# Patient Record
Sex: FEMALE | Race: BLACK OR AFRICAN AMERICAN
[De-identification: names, ages, dates, MRNs, and addresses within clinical notes are randomized per-mention and may not be internally consistent; named-entity substitution may affect disease eponyms.]

---

## 2017-07-23 ENCOUNTER — HOSPITAL ENCOUNTER (EMERGENCY)
Dept: HOSPITAL 61 - ER | Age: 37
Discharge: HOME | End: 2017-07-23
Payer: COMMERCIAL

## 2017-07-23 VITALS — BODY MASS INDEX: 35.36 KG/M2 | WEIGHT: 220 LBS | HEIGHT: 66 IN

## 2017-07-23 VITALS — SYSTOLIC BLOOD PRESSURE: 239 MMHG | DIASTOLIC BLOOD PRESSURE: 114 MMHG

## 2017-07-23 DIAGNOSIS — I12.9: Primary | ICD-10-CM

## 2017-07-23 DIAGNOSIS — N18.9: ICD-10-CM

## 2017-07-23 DIAGNOSIS — Z99.2: ICD-10-CM

## 2017-07-23 DIAGNOSIS — E10.22: ICD-10-CM

## 2017-07-23 PROCEDURE — 99281 EMR DPT VST MAYX REQ PHY/QHP: CPT

## 2017-07-23 NOTE — PHYS DOC
Past Medical History


Past Medical History:  Diabetes-Type I, Hypertension, Renal Failure


Past Surgical History:  


Additional Past Surgical Histo:  PERITONEAL SURGERY


Alcohol Use:  None


Drug Use:  None





Adult General


Chief Complaint


Chief Complaint:  OTHER COMPLAINTS





HPI


HPI





37-year-old female who undergoes peritoneal dialysis was sent here by a local 

dialysis clinic for blood draw. The patient denies any symptoms she denies 

shortness of breath nausea vomiting fevers or chills. Onset today. Location 

generalized. Duration intermittent. No alleviating factors present.





ROS is negative for chest pain shortness of breath nausea vomiting fevers 

chills. All other review of systems is negative unless otherwise noted in 

history of present illness.





ED course: 37-year-old female presenting to the emergency department today 

desiring a blood draw. Triage vital signs afebrile with significant 

hypertension at 240 Nearly over 115. The remainder the vital signs were 

unremarkable. The patient was well-appearing nontoxic and not in any distress 

when I examined her. I explained to her that when evaluated in the emergency 

department we performed our lab here to ensure she does not have an emergent 

medical condition. I offered to forward a copy of those labs to her 

nephrologist. It is my concern that her potassium could be abnormal which I 

would want to have tested. The patient was upset and only desired for the blood 

to be drawn and taken with her to clinic. I explained to her my concerns. The 

patient then eloped without discharge instructions. She demonstrated medical 

decision making capacity at that time and understood the risks. Patient did not 

have any evidence of end organ damage.





Review of Systems


Review of Systems


SEE ABOVE.





Allergies


Allergies





Allergies








Coded Allergies Type Severity Reaction Last Updated Verified


 


  No Known Drug Allergies    17 No











Physical Exam


Physical Exam


SEE ABOVE





Constitutional: Well developed, well nourished, no acute distress, non-toxic 

appearance. []


HENT: Normocephalic, atraumatic, bilateral external ears normal, oropharynx 

moist, no oral exudates, nose normal. []


Eyes: PERRLA, EOMI, conjunctiva normal, no discharge. 


Neck: Normal range of motion, no tenderness, supple, no stridor. [] 


Cardiovascular:Heart rate regular rhythm, no murmur []


Lungs & Thorax:  Bilateral breath sounds clear to auscultation 


Abdomen: Bowel sounds normal, soft, no tenderness, no masses, no pulsatile 

masses. [] 


Skin: Warm, dry, no erythema, no rash. [] 


Back: No tenderness, no CVA tenderness. [] 


Extremities: No tenderness, no cyanosis, no clubbing, ROM intact, no edema. 


Neurologic: Alert and oriented X 3, normal motor function, normal sensory 

function, no focal deficits noted. []


Psychologic: Affect normal, judgement normal, mood normal. []





Current Patient Data


Vital Signs





 Vital Signs








  Date Time  Temp Pulse Resp B/P (MAP) Pulse Ox O2 Delivery O2 Flow Rate FiO2


 


17 12:05 97.7 69 18  99 Room Air  





 97.7       











EKG


EKG


[]





Radiology/Procedures


Radiology/Procedures


[]





Course & Med Decision Making


Course & Med Decision Making


Pertinent Labs and Imaging studies reviewed. (See chart for details)





[]





Dragon Disclaimer


Dragon Disclaimer


This electronic medical record was generated, in whole or in part, using a 

voice recognition dictation system.





Departure


Departure


Impression:  


 Primary Impression:  


 Hypertension


Disposition:   HOME, SELF-CARE (ELOPED)


Condition:  STABLE


Referrals:  


NON,STAFF (PCP)











NOLA DIANA MD 2017 13:10

## 2018-02-18 ENCOUNTER — HOSPITAL ENCOUNTER (EMERGENCY)
Dept: HOSPITAL 61 - ER | Age: 38
Discharge: HOME | End: 2018-02-18
Payer: MEDICARE

## 2018-02-18 DIAGNOSIS — M25.552: Primary | ICD-10-CM

## 2018-02-18 DIAGNOSIS — I12.9: ICD-10-CM

## 2018-02-18 DIAGNOSIS — N18.9: ICD-10-CM

## 2018-02-18 DIAGNOSIS — E10.22: ICD-10-CM

## 2018-02-18 PROCEDURE — 73502 X-RAY EXAM HIP UNI 2-3 VIEWS: CPT

## 2018-02-18 PROCEDURE — 99284 EMERGENCY DEPT VISIT MOD MDM: CPT

## 2019-04-13 ENCOUNTER — HOSPITAL ENCOUNTER (INPATIENT)
Dept: HOSPITAL 61 - ER | Age: 39
LOS: 1 days | Discharge: HOME | DRG: 391 | End: 2019-04-14
Attending: FAMILY MEDICINE | Admitting: FAMILY MEDICINE
Payer: MEDICARE

## 2019-04-13 VITALS — HEIGHT: 64 IN | BODY MASS INDEX: 38.76 KG/M2 | WEIGHT: 227 LBS

## 2019-04-13 VITALS — SYSTOLIC BLOOD PRESSURE: 188 MMHG | DIASTOLIC BLOOD PRESSURE: 78 MMHG

## 2019-04-13 VITALS — DIASTOLIC BLOOD PRESSURE: 79 MMHG | SYSTOLIC BLOOD PRESSURE: 160 MMHG

## 2019-04-13 VITALS — DIASTOLIC BLOOD PRESSURE: 92 MMHG | SYSTOLIC BLOOD PRESSURE: 204 MMHG

## 2019-04-13 DIAGNOSIS — E10.22: ICD-10-CM

## 2019-04-13 DIAGNOSIS — E66.01: ICD-10-CM

## 2019-04-13 DIAGNOSIS — Z99.2: ICD-10-CM

## 2019-04-13 DIAGNOSIS — K21.9: Primary | ICD-10-CM

## 2019-04-13 DIAGNOSIS — Z82.49: ICD-10-CM

## 2019-04-13 DIAGNOSIS — D64.9: ICD-10-CM

## 2019-04-13 DIAGNOSIS — F32.9: ICD-10-CM

## 2019-04-13 DIAGNOSIS — N18.6: ICD-10-CM

## 2019-04-13 DIAGNOSIS — Z98.891: ICD-10-CM

## 2019-04-13 DIAGNOSIS — I12.0: ICD-10-CM

## 2019-04-13 DIAGNOSIS — Z79.4: ICD-10-CM

## 2019-04-13 DIAGNOSIS — R07.89: ICD-10-CM

## 2019-04-13 DIAGNOSIS — E78.5: ICD-10-CM

## 2019-04-13 DIAGNOSIS — Z83.3: ICD-10-CM

## 2019-04-13 LAB
ALBUMIN SERPL-MCNC: 3.6 G/DL (ref 3.4–5)
ALBUMIN/GLOB SERPL: 0.7 {RATIO} (ref 1–1.7)
ALP SERPL-CCNC: 58 U/L (ref 46–116)
ALT SERPL-CCNC: 14 U/L (ref 14–59)
ANION GAP SERPL CALC-SCNC: 16 MMOL/L (ref 6–14)
AST SERPL-CCNC: 17 U/L (ref 15–37)
BASOPHILS # BLD AUTO: 0.1 X10^3/UL (ref 0–0.2)
BASOPHILS NFR BLD: 1 % (ref 0–3)
BILIRUB SERPL-MCNC: 0.4 MG/DL (ref 0.2–1)
BUN SERPL-MCNC: 49 MG/DL (ref 7–20)
BUN/CREAT SERPL: 5 (ref 6–20)
CALCIUM SERPL-MCNC: 9.3 MG/DL (ref 8.5–10.1)
CHLORIDE SERPL-SCNC: 96 MMOL/L (ref 98–107)
CO2 SERPL-SCNC: 25 MMOL/L (ref 21–32)
CREAT SERPL-MCNC: 9.3 MG/DL (ref 0.6–1)
EOSINOPHIL NFR BLD: 0.1 X10^3/UL (ref 0–0.7)
EOSINOPHIL NFR BLD: 1 % (ref 0–3)
ERYTHROCYTE [DISTWIDTH] IN BLOOD BY AUTOMATED COUNT: 19.8 % (ref 11.5–14.5)
GFR SERPLBLD BASED ON 1.73 SQ M-ARVRAT: 5.8 ML/MIN
GLOBULIN SER-MCNC: 4.9 G/DL (ref 2.2–3.8)
GLUCOSE SERPL-MCNC: 332 MG/DL (ref 70–99)
HCT VFR BLD CALC: 39.1 % (ref 36–47)
HGB BLD-MCNC: 12.1 G/DL (ref 12–15.5)
LIPASE: 167 U/L (ref 73–393)
LYMPHOCYTES # BLD: 3.5 X10^3/UL (ref 1–4.8)
LYMPHOCYTES NFR BLD AUTO: 38 % (ref 24–48)
MCH RBC QN AUTO: 26 PG (ref 25–35)
MCHC RBC AUTO-ENTMCNC: 31 G/DL (ref 31–37)
MCV RBC AUTO: 85 FL (ref 79–100)
MONO #: 0.8 X10^3/UL (ref 0–1.1)
MONOCYTES NFR BLD: 9 % (ref 0–9)
NEUT #: 4.8 X10^3UL (ref 1.8–7.7)
NEUTROPHILS NFR BLD AUTO: 52 % (ref 31–73)
PLATELET # BLD AUTO: 277 X10^3/UL (ref 140–400)
POTASSIUM SERPL-SCNC: 3.7 MMOL/L (ref 3.5–5.1)
PROT SERPL-MCNC: 8.5 G/DL (ref 6.4–8.2)
RBC # BLD AUTO: 4.63 X10^6/UL (ref 3.5–5.4)
SODIUM SERPL-SCNC: 137 MMOL/L (ref 136–145)
WBC # BLD AUTO: 9.2 X10^3/UL (ref 4–11)

## 2019-04-13 PROCEDURE — 84484 ASSAY OF TROPONIN QUANT: CPT

## 2019-04-13 PROCEDURE — 96361 HYDRATE IV INFUSION ADD-ON: CPT

## 2019-04-13 PROCEDURE — 83690 ASSAY OF LIPASE: CPT

## 2019-04-13 PROCEDURE — 96375 TX/PRO/DX INJ NEW DRUG ADDON: CPT

## 2019-04-13 PROCEDURE — 71045 X-RAY EXAM CHEST 1 VIEW: CPT

## 2019-04-13 PROCEDURE — 80053 COMPREHEN METABOLIC PANEL: CPT

## 2019-04-13 PROCEDURE — 36415 COLL VENOUS BLD VENIPUNCTURE: CPT

## 2019-04-13 PROCEDURE — 96374 THER/PROPH/DIAG INJ IV PUSH: CPT

## 2019-04-13 PROCEDURE — 85025 COMPLETE CBC W/AUTO DIFF WBC: CPT

## 2019-04-13 PROCEDURE — 80069 RENAL FUNCTION PANEL: CPT

## 2019-04-13 PROCEDURE — 76705 ECHO EXAM OF ABDOMEN: CPT

## 2019-04-13 PROCEDURE — 82962 GLUCOSE BLOOD TEST: CPT

## 2019-04-13 PROCEDURE — 93005 ELECTROCARDIOGRAM TRACING: CPT

## 2019-04-13 RX ADMIN — HYDRALAZINE HYDROCHLORIDE PRN MG: 20 INJECTION INTRAMUSCULAR; INTRAVENOUS at 18:59

## 2019-04-13 NOTE — PDOC1
History and Physical


Date of Admission


Date of Admission


DATE: 19 


TIME: 21:00





Identification/Chief Complaint


Chief Complaint


 seen in er with complaints of mid chest pain which radiates into her right arm 

and upper abdominal pain. 





reports she had eaten lunch at [employer] around 3:15 and following the meal she 

had onset of symptoms, on arrival has complaints of nausea and during initial 

exam with this patient had active vomiting. sono neg for gallstones





Past Medical History


Past Medical History


   


 Past Medical History 


Past Medical History


Past Medical History:  Diabetes-Type I, Hypertension, Renal Failure


Past Surgical History:  


Additional Past Surgical Histo:  PERITONEAL SURGERY


Alcohol Use:  None


Drug Use:  None





FAMILY HX OBESITY, DIABETES


Endocrine:  Diabetes





Family History


Family History:  High Cholestrol





Social History


Smoke:  No


ALCOHOL:  none


Drugs:  None





Current Problem List


Problem List


Problems


Medical Problems:


(1) Chest pain


Status: Acute  





(2) Shortness of breath


Status: Acute  











Current Medications


Current Medications





Current Medications


Ondansetron HCl (Zofran) 4 mg 1X  ONCE IV  Last administered on 19at 16:05

;  Start 19 at 16:00;  Stop 19 at 16:02;  Status DC


Sodium Chloride 1,000 ml @  1,000 mls/hr 1X  ONCE IV  Last administered on at 16:15;  Start 19 at 16:15;  Stop 19 at 17:14;  Status DC


Fentanyl Citrate (Fentanyl 2ml Vial) 25 mcg 1X  ONCE IV  Last administered on at 16:20;  Start 19 at 16:15;  Stop 19 at 16:16;  Status DC


Clonidine HCl (Catapres) 0.1 mg 1X  ONCE PO  Last administered on 19at 17:

45;  Start 19 at 17:45;  Stop 19 at 17:50;  Status DC


Aspirin (Children'S Aspirin) 324 mg 1X  ONCE PO  Last administered on 19at 

17:45;  Start 19 at 17:45;  Stop 19 at 17:50;  Status DC


Hydralazine HCl (Apresoline Inj) 10 mg PRN Q4HRS  PRN IVP ELEVATED BP, SEE 

COMMENTS Last administered on 19at 18:59;  Start 19 at 18:45


Amlodipine Besylate (Norvasc) 10 mg DAILY PO ;  Start 19 at 09:00


Carvedilol (Coreg) 12.5 mg BIDWMEALS PO ;  Start 19 at 08:00


Citalopram Hydrobromide (CeleXA) 10 mg DAILY PO ;  Start 19 at 09:00;  

Stop 19 at 09:00;  Status DC


Citalopram Hydrobromide (CeleXA) 10 mg HS PO ;  Start 19 at 21:00


Loperamide HCl (Imodium) 2 mg PRN Q8HRS  PRN PO DIARRHEA Last administered on at 20:10;  Start 19 at 20:00





Active Scripts


Active


Reported


Celexa (Citalopram Hydrobromide) 10 Mg Tablet 1 Tab PO DAILY


Coreg  ** (Carvedilol) 12.5 Mg Tablet 12.5 Mg PO BIDWMEALS


Amlodipine Besylate 10 Mg Tablet 10 Mg PO DAILY





Allergies


Allergies:  


Coded Allergies:  


     No Known Drug Allergies (Unverified , 19)





ROS


Review of System





Review of Systems


Review of Systems





Constitutional: Denies fever or chills []


Eyes: Denies change in visual acuity, redness, or eye pain []


HENT: Denies nasal congestion or sore throat []


Respiratory: Denies cough or shortness of breath []


Cardiovascular: Reports mid to rt side CP


GI: Denies bloody stools or diarrhea. Reports rt upper abd pain with N/V


: Denies dysuria or hematuria []


Musculoskeletal: Denies back/neck pain. Reports rt upper arm pain


Integument: Denies rash or skin lesions []


Neurologic: Denies headache, focal weakness or sensory changes []


Endocrine: Denies polyuria or polydipsia []





14 pt  systems were reviewed and found to be within normal limits, except as 

documented


Gastrointestinal:  Yes Vomiting





Physical Exam


Physical Exam





Physical Exam


Physical Exam





Constitutional: Well developed, well nourished, mild distress on initial exam, 

non-toxic appearance. []


HENT: Normocephalic, atraumatic, oropharynx moist, no oral exudates, nose 

normal. []


Eyes: Pupils equal, conjunctiva normal, no discharge. [] 


Neck: Normal range of motion, no tenderness, supple, no stridor. [] 


Cardiovascular: Heart rate regular rhythm, no murmur []


Lungs & Thorax:  Bilateral breath sounds clear to auscultation. Resp. equal/

nonlabored


Abdomen: Bowel sounds normal, soft- no distention/rigidity. Tender on palp. RUQ

, no masses, no pulsatile masses. [] 


Skin: Warm, dry, no erythema, no rash. [] 


Back: No tenderness, no CVA tenderness. [] 


Extremities: No tenderness, no cyanosis, no clubbing, ROM intact, no edema. [] 


Neurologic: Alert and oriented X 3, normal motor function, normal sensory 

function, no focal deficits noted. []


Psychologic: Affect normal, judgement normal, mood normal. []


General:  Alert, Oriented X3, Cooperative, mild distress


HEENT:  Atraumatic


Lungs:  Clear to auscultation


Breasts:  Not examined


Abdomen:  Normal bowel sounds, Soft


Rectal Exam:  not examined


Neuro:  Normal speech, Cranial nerves 3-12 NL


Psych/Mental Status:  Mental status NL, Mood NL





Vitals


Vitals





Vital Signs








  Date Time  Temp Pulse Resp B/P (MAP) Pulse Ox O2 Delivery O2 Flow Rate FiO2


 


19 19:39      Room Air  


 


19 19:00 97.0 74 18 160/79 (106) 99   





 97.0       











Labs


Labs





Laboratory Tests








Test


 19


15:55 19


20:54


 


White Blood Count


 9.2 x10^3/uL


(4.0-11.0) 





 


Red Blood Count


 4.63 x10^6/uL


(3.50-5.40) 





 


Hemoglobin


 12.1 g/dL


(12.0-15.5) 





 


Hematocrit


 39.1 %


(36.0-47.0) 





 


Mean Corpuscular Volume 85 fL ()  


 


Mean Corpuscular Hemoglobin 26 pg (25-35)  


 


Mean Corpuscular Hemoglobin


Concent 31 g/dL


(31-37) 





 


Red Cell Distribution Width


 19.8 %


(11.5-14.5) 





 


Platelet Count


 277 x10^3/uL


(140-400) 





 


Neutrophils (%) (Auto) 52 % (31-73)  


 


Lymphocytes (%) (Auto) 38 % (24-48)  


 


Monocytes (%) (Auto) 9 % (0-9)  


 


Eosinophils (%) (Auto) 1 % (0-3)  


 


Basophils (%) (Auto) 1 % (0-3)  


 


Neutrophils # (Auto)


 4.8 x10^3uL


(1.8-7.7) 





 


Lymphocytes # (Auto)


 3.5 x10^3/uL


(1.0-4.8) 





 


Monocytes # (Auto)


 0.8 x10^3/uL


(0.0-1.1) 





 


Eosinophils # (Auto)


 0.1 x10^3/uL


(0.0-0.7) 





 


Basophils # (Auto)


 0.1 x10^3/uL


(0.0-0.2) 





 


Sodium Level


 137 mmol/L


(136-145) 





 


Potassium Level


 3.7 mmol/L


(3.5-5.1) 





 


Chloride Level


 96 mmol/L


() 





 


Carbon Dioxide Level


 25 mmol/L


(21-32) 





 


Anion Gap 16 (6-14)  


 


Blood Urea Nitrogen


 49 mg/dL


(7-20) 





 


Creatinine


 9.3 mg/dL


(0.6-1.0) 





 


Estimated GFR


(Cockcroft-Gault) 5.8 


 





 


BUN/Creatinine Ratio 5 (6-20)  


 


Glucose Level


 332 mg/dL


(70-99) 





 


Calcium Level


 9.3 mg/dL


(8.5-10.1) 





 


Total Bilirubin


 0.4 mg/dL


(0.2-1.0) 





 


Aspartate Amino Transf


(AST/SGOT) 17 U/L (15-37) 


 





 


Alanine Aminotransferase


(ALT/SGPT) 14 U/L (14-59) 


 





 


Alkaline Phosphatase


 58 U/L


() 





 


Troponin I Quantitative


 < 0.017 ng/mL


(0.000-0.055) 





 


Total Protein


 8.5 g/dL


(6.4-8.2) 





 


Albumin


 3.6 g/dL


(3.4-5.0) 





 


Albumin/Globulin Ratio 0.7 (1.0-1.7)  


 


Lipase


 167 U/L


() 





 


Glucose (Fingerstick)


 


 295 mg/dL


(70-99)








Laboratory Tests








Test


 19


15:55 19


20:54


 


White Blood Count


 9.2 x10^3/uL


(4.0-11.0) 





 


Red Blood Count


 4.63 x10^6/uL


(3.50-5.40) 





 


Hemoglobin


 12.1 g/dL


(12.0-15.5) 





 


Hematocrit


 39.1 %


(36.0-47.0) 





 


Mean Corpuscular Volume 85 fL ()  


 


Mean Corpuscular Hemoglobin 26 pg (25-35)  


 


Mean Corpuscular Hemoglobin


Concent 31 g/dL


(31-37) 





 


Red Cell Distribution Width


 19.8 %


(11.5-14.5) 





 


Platelet Count


 277 x10^3/uL


(140-400) 





 


Neutrophils (%) (Auto) 52 % (31-73)  


 


Lymphocytes (%) (Auto) 38 % (24-48)  


 


Monocytes (%) (Auto) 9 % (0-9)  


 


Eosinophils (%) (Auto) 1 % (0-3)  


 


Basophils (%) (Auto) 1 % (0-3)  


 


Neutrophils # (Auto)


 4.8 x10^3uL


(1.8-7.7) 





 


Lymphocytes # (Auto)


 3.5 x10^3/uL


(1.0-4.8) 





 


Monocytes # (Auto)


 0.8 x10^3/uL


(0.0-1.1) 





 


Eosinophils # (Auto)


 0.1 x10^3/uL


(0.0-0.7) 





 


Basophils # (Auto)


 0.1 x10^3/uL


(0.0-0.2) 





 


Sodium Level


 137 mmol/L


(136-145) 





 


Potassium Level


 3.7 mmol/L


(3.5-5.1) 





 


Chloride Level


 96 mmol/L


() 





 


Carbon Dioxide Level


 25 mmol/L


(21-32) 





 


Anion Gap 16 (6-14)  


 


Blood Urea Nitrogen


 49 mg/dL


(7-20) 





 


Creatinine


 9.3 mg/dL


(0.6-1.0) 





 


Estimated GFR


(Cockcroft-Gault) 5.8 


 





 


BUN/Creatinine Ratio 5 (6-20)  


 


Glucose Level


 332 mg/dL


(70-99) 





 


Calcium Level


 9.3 mg/dL


(8.5-10.1) 





 


Total Bilirubin


 0.4 mg/dL


(0.2-1.0) 





 


Aspartate Amino Transf


(AST/SGOT) 17 U/L (15-37) 


 





 


Alanine Aminotransferase


(ALT/SGPT) 14 U/L (14-59) 


 





 


Alkaline Phosphatase


 58 U/L


() 





 


Troponin I Quantitative


 < 0.017 ng/mL


(0.000-0.055) 





 


Total Protein


 8.5 g/dL


(6.4-8.2) 





 


Albumin


 3.6 g/dL


(3.4-5.0) 





 


Albumin/Globulin Ratio 0.7 (1.0-1.7)  


 


Lipase


 167 U/L


() 





 


Glucose (Fingerstick)


 


 295 mg/dL


(70-99)











Images


Images





EXAM: RIGHT UPPER QUADRANT ULTRASOUND.


 


HISTORY: Right upper quadrant pain.


 


COMPARISON: None.


 


FINDINGS: Sonographic evaluation of the right upper quadrant was 


performed.


 


The liver appears normal in parenchymal echotexture. There are no focal 


lesions. 


 


The gallbladder is unremarkable without evidence of stones, wall 


thickening or pericholecystic fluid. There is no sonographic Peguero sign. 


The common duct measures 5 mm. The visualized portions of the head of the 


pancreas reveal no abnormality.


 


The right kidney measures 8.7 cm. Cortical thickness is preserved. 


Cortical echogenicity is mildly increased. There is no hydronephrosis.


 


The visualized portions of the abdominal aorta and inferior vena cava are 


grossly patent and normal in caliber.


 


IMPRESSION:


1. No cause for pain is identified.


2. Increased right renal cortical echogenicity is consistent with 


intrinsic renal disease.


 


Electronically signed by: CHRISTINA Saxena MD (2019 5:21 PM) 


[facility]





EXAM: CHEST 1 VIEW.


 


HISTORY: Chest pain.


 


COMPARISON: 2009.


 


FINDINGS: A frontal view of the chest is obtained.


 


There are no confluent infiltrates. There is no pneumothorax or pleural 


effusion. The heart is mildly enlarged given this projection.


 


IMPRESSION: 


1. Mild cardiomegaly.


 


Electronically signed by: CHRISTINA Saxena MD (2019 4:44 PM) 


[facility]














DICTATED and SIGNED BY:     ANJU SAXENA MD





VTE Prophylaxis Ordered


VTE Prophylaxis Devices:  Yes


VTE Pharmacological Prophylaxi:  Yes





Assessment/Plan


Assessment/Plan


impression





1. chest pain


The gallbladder is unremarkable without evidence of stones, wall 


thickening or pericholecystic fluid. There is no sonographic Peguero sign. on 

sono


2. ESRD on dialysis


4. diabetes suboptimal control


5. morbid obesity


6. HYPERTENSION


7. GERD








plan





cvc admit


serial troponin i 


echo


consult cardiology


consult nephrology


dvt prophylaxis


BP CONTROL


PROTONIX 40MG PO DAILY


ss insulin











KIMMIE PIMENTEL MD 2019 21:01

## 2019-04-13 NOTE — RAD
EXAM: RIGHT UPPER QUADRANT ULTRASOUND.

 

HISTORY: Right upper quadrant pain.

 

COMPARISON: None.

 

FINDINGS: Sonographic evaluation of the right upper quadrant was 

performed.

 

The liver appears normal in parenchymal echotexture. There are no focal 

lesions. 

 

The gallbladder is unremarkable without evidence of stones, wall 

thickening or pericholecystic fluid. There is no sonographic Peguero sign. 

The common duct measures 5 mm. The visualized portions of the head of the 

pancreas reveal no abnormality.

 

The right kidney measures 8.7 cm. Cortical thickness is preserved. 

Cortical echogenicity is mildly increased. There is no hydronephrosis.

 

The visualized portions of the abdominal aorta and inferior vena cava are 

grossly patent and normal in caliber.

 

IMPRESSION:

1. No cause for pain is identified.

2. Increased right renal cortical echogenicity is consistent with 

intrinsic renal disease.

 

Electronically signed by: CHRISTINA Saxena MD (4/13/2019 5:21 PM) 

[id number]

## 2019-04-13 NOTE — PHYS DOC
Past Medical History


Past Medical History:  Diabetes-Type I, Hypertension, Renal Failure


Past Surgical History:  


Additional Past Surgical Histo:  PERITONEAL SURGERY


Alcohol Use:  None


Drug Use:  None





Adult General


Chief Complaint


Chief Complaint:  CHEST PAIN





HPI


HPI





38 year old female presents to ER via POV for complaints of mid chest pain 

which radiates into her right arm and upper abdominal pain. Patient reports she 

had eaten lunch at [email or web address] around 3:15 and following the meal she had onset of 

symptoms. Patient on arrival has complaints of nausea and during initial exam 

with this patient had active vomiting. She does still have her gallbladder. 

Patient denies any recent flulike illness. She reports she was having no 

symptoms prior to lunch.





Review of Systems


Review of Systems





Constitutional: Denies fever or chills []


Eyes: Denies change in visual acuity, redness, or eye pain []


HENT: Denies nasal congestion or sore throat []


Respiratory: Denies cough or shortness of breath []


Cardiovascular: Reports mid to rt side CP


GI: Denies bloody stools or diarrhea. Reports rt upper abd pain with N/V


: Denies dysuria or hematuria []


Musculoskeletal: Denies back/neck pain. Reports rt upper arm pain


Integument: Denies rash or skin lesions []


Neurologic: Denies headache, focal weakness or sensory changes []


Endocrine: Denies polyuria or polydipsia []





All other systems were reviewed and found to be within normal limits, except as 

documented in this note.





Current Medications


Current Medications





Current Medications








 Medications


  (Trade)  Dose


 Ordered  Sig/Mikayla  Start Time


 Stop Time Status Last Admin


Dose Admin


 


 Fentanyl Citrate


  (Fentanyl 2ml


 Vial)  25 mcg  1X  ONCE  19 16:15


 19 16:16 DC 19 16:20


25 MCG


 


 Ondansetron HCl


  (Zofran)  4 mg  1X  ONCE  19 16:00


 19 16:02 DC 19 16:05


4 MG


 


 Sodium Chloride  1,000 ml @ 


 1,000 mls/hr  1X  ONCE  19 16:15


 19 17:14 DC 19 16:15


1,000 MLS/HR











Allergies


Allergies





Allergies








Coded Allergies Type Severity Reaction Last Updated Verified


 


  No Known Drug Allergies    19 No











Physical Exam


Physical Exam





Constitutional: Well developed, well nourished, mild distress on initial exam, 

non-toxic appearance. []


HENT: Normocephalic, atraumatic, oropharynx moist, no oral exudates, nose 

normal. []


Eyes: Pupils equal, conjunctiva normal, no discharge. [] 


Neck: Normal range of motion, no tenderness, supple, no stridor. [] 


Cardiovascular: Heart rate regular rhythm, no murmur []


Lungs & Thorax:  Bilateral breath sounds clear to auscultation. Resp. equal/

nonlabored


Abdomen: Bowel sounds normal, soft- no distention/rigidity. Tender on palp. RUQ

, no masses, no pulsatile masses. [] 


Skin: Warm, dry, no erythema, no rash. [] 


Back: No tenderness, no CVA tenderness. [] 


Extremities: No tenderness, no cyanosis, no clubbing, ROM intact, no edema. [] 


Neurologic: Alert and oriented X 3, normal motor function, normal sensory 

function, no focal deficits noted. []


Psychologic: Affect normal, judgement normal, mood normal. []





Current Patient Data


Vital Signs





 Vital Signs








  Date Time  Temp Pulse Resp B/P (MAP) Pulse Ox O2 Delivery O2 Flow Rate FiO2


 


19 17:34  81 16 206/91 (129)    


 


19 15:40 97.6    98 Room Air  





 97.6       








Lab Values





 Laboratory Tests








Test


 19


15:55


 


White Blood Count


 9.2 x10^3/uL


(4.0-11.0)


 


Red Blood Count


 4.63 x10^6/uL


(3.50-5.40)


 


Hemoglobin


 12.1 g/dL


(12.0-15.5)


 


Hematocrit


 39.1 %


(36.0-47.0)


 


Mean Corpuscular Volume


 85 fL ()





 


Mean Corpuscular Hemoglobin 26 pg (25-35)  


 


Mean Corpuscular Hemoglobin


Concent 31 g/dL


(31-37)


 


Red Cell Distribution Width


 19.8 %


(11.5-14.5)  H


 


Platelet Count


 277 x10^3/uL


(140-400)


 


Neutrophils (%) (Auto) 52 % (31-73)  


 


Lymphocytes (%) (Auto) 38 % (24-48)  


 


Monocytes (%) (Auto) 9 % (0-9)  


 


Eosinophils (%) (Auto) 1 % (0-3)  


 


Basophils (%) (Auto) 1 % (0-3)  


 


Neutrophils # (Auto)


 4.8 x10^3uL


(1.8-7.7)


 


Lymphocytes # (Auto)


 3.5 x10^3/uL


(1.0-4.8)


 


Monocytes # (Auto)


 0.8 x10^3/uL


(0.0-1.1)


 


Eosinophils # (Auto)


 0.1 x10^3/uL


(0.0-0.7)


 


Basophils # (Auto)


 0.1 x10^3/uL


(0.0-0.2)


 


Sodium Level


 137 mmol/L


(136-145)


 


Potassium Level


 3.7 mmol/L


(3.5-5.1)


 


Chloride Level


 96 mmol/L


()  L


 


Carbon Dioxide Level


 25 mmol/L


(21-32)


 


Anion Gap 16 (6-14)  H


 


Blood Urea Nitrogen


 49 mg/dL


(7-20)  H


 


Creatinine


 9.3 mg/dL


(0.6-1.0)  H


 


Estimated GFR


(Cockcroft-Gault) 5.8  





 


BUN/Creatinine Ratio 5 (6-20)  L


 


Glucose Level


 332 mg/dL


(70-99)  H


 


Calcium Level


 9.3 mg/dL


(8.5-10.1)


 


Total Bilirubin


 0.4 mg/dL


(0.2-1.0)


 


Aspartate Amino Transferase


(AST) 17 U/L (15-37)





 


Alanine Aminotransferase (ALT)


 14 U/L (14-59)





 


Alkaline Phosphatase


 58 U/L


()


 


Troponin I Quantitative


 < 0.017 ng/mL


(0.000-0.055)


 


Total Protein


 8.5 g/dL


(6.4-8.2)  H


 


Albumin


 3.6 g/dL


(3.4-5.0)


 


Albumin/Globulin Ratio


 0.7 (1.0-1.7)


L


 


Lipase


 167 U/L


()





 Laboratory Tests


19 15:55








 Laboratory Tests


19 15:55














EKG


EKG


EKG obtained 19 at 1547


Interpreted by ER physician





Sinus rhythm


Ltward axis


Rate 88


No STEMI





Radiology/Procedures


Radiology/Procedures


PROCEDURE: CHEST AP ONLY





EXAM: CHEST 1 VIEW.


 


HISTORY: Chest pain.


 


COMPARISON: 2009.


 


FINDINGS: A frontal view of the chest is obtained.


 


There are no confluent infiltrates. There is no pneumothorax or pleural 


effusion. The heart is mildly enlarged given this projection.


 


IMPRESSION: 


1. Mild cardiomegaly.


 


Electronically signed by: CHRISTINA Saxena MD (2019 4:44 PM) 


[facility] [place]














DICTATED and SIGNED BY:     ANJU SAXENA MD


DATE:     19 1644





PROCEDURE: ABDOMEN LTD





EXAM: RIGHT UPPER QUADRANT ULTRASOUND.


 


HISTORY: Right upper quadrant pain.


 


COMPARISON: None.


 


FINDINGS: Sonographic evaluation of the right upper quadrant was 


performed.


 


The liver appears normal in parenchymal echotexture. There are no focal 


lesions. 


 


The gallbladder is unremarkable without evidence of stones, wall 


thickening or pericholecystic fluid. There is no sonographic Peguero sign. 


The common duct measures 5 mm. The visualized portions of the head of the 


pancreas reveal no abnormality.


 


The right kidney measures 8.7 cm. Cortical thickness is preserved. 


Cortical echogenicity is mildly increased. There is no hydronephrosis.


 


The visualized portions of the abdominal aorta and inferior vena cava are 


grossly patent and normal in caliber.


 


IMPRESSION:


1. No cause for pain is identified.


2. Increased right renal cortical echogenicity is consistent with 


intrinsic renal disease.


 


Electronically signed by: CHRISTINA Saxena MD (2019 5:21 PM) 


[facility] [place] [facility] [place]














DICTATED and SIGNED BY:     ANJU SAXENA MD


DATE:     19 1721





Course & Med Decision Making


Course & Med Decision Making


Pertinent Labs and Imaging studies reviewed. (See chart for details)





1735: Patient was evaluated in the ER for complaints of sudden onset of right-

sided chest pain which radiated into her right arm. On exam patient also had 

right upper abdominal tenderness on palpation. Patient had EKG and labs 

obtained with no acute ST elevation or STEMI and initial troponin was <0.017;

chest x-ray showed mild organomegaly otherwise no acute findings. Ultrasound 

gallbladder was obtained with no acute findings noted. Patient reports her 

symptoms had improved as she had active vomiting during initial exam. Following 

dose of Zofran patient reported her nausea had subsided. Patient reported her 

parents both had cardiac history with her father having an MI in his 50s. With 

patient's onset of chest pain happening just prior to arrival to ER discussed 

admission to complete serial cardiac enzymes-patient is agreeable with plan. 

Will admit to hospitalist services for further care and monitoring. Will give 

patient 324 mg of aspirin. Patient's BP at time of admission discussion was 203/

82 with heart rate 95 respirations 18 and room air saturation at 98%. Will 

provide patient with dose of Clonidine 0.1mg PO. Pt does have history of 

hypertension and has not taken her prescribed medications today. 





1750: Spoke with Dr. Li, hospitalist and discussed pt's case and admit 

plan to complete serial cardiac enzymes. 





She reports her dialysis schedule is  however this 

week her schedule was changed and she had dialysis yesterday instead of today. 

HEART score is 2 however with pt having HTN/DM and family hx of CAD with both 

mother and father- admitted to further r/o ACS as her sxs started just prior to 

arrival to ER.





Dragon Disclaimer


Dragon Disclaimer


This electronic medical record was generated, in whole or in part, using a 

voice recognition dictation system.





Departure


Departure


Impression:  


 Primary Impression:  


 Chest pain


 Additional Impression:  


 Shortness of breath


Disposition:   ADMITTED AS INPATIENT


Admitting Physician:  Javier Gomez


Condition:  STABLE


Referrals:  


UNKNOWN PCP NAME (PCP)





Problem Qualifiers











PRECIOUS RCIHARDS 2019 16:34

## 2019-04-13 NOTE — RAD
EXAM: CHEST 1 VIEW.

 

HISTORY: Chest pain.

 

COMPARISON: 02/25/2009.

 

FINDINGS: A frontal view of the chest is obtained.

 

There are no confluent infiltrates. There is no pneumothorax or pleural 

effusion. The heart is mildly enlarged given this projection.

 

IMPRESSION: 

1. Mild cardiomegaly.

 

Electronically signed by: CHRISTINA Saxena MD (4/13/2019 4:44 PM) 

[facility]

## 2019-04-14 VITALS — SYSTOLIC BLOOD PRESSURE: 163 MMHG | DIASTOLIC BLOOD PRESSURE: 80 MMHG

## 2019-04-14 VITALS — SYSTOLIC BLOOD PRESSURE: 130 MMHG | DIASTOLIC BLOOD PRESSURE: 49 MMHG

## 2019-04-14 VITALS — SYSTOLIC BLOOD PRESSURE: 196 MMHG | DIASTOLIC BLOOD PRESSURE: 94 MMHG

## 2019-04-14 VITALS — SYSTOLIC BLOOD PRESSURE: 145 MMHG | DIASTOLIC BLOOD PRESSURE: 68 MMHG

## 2019-04-14 VITALS — DIASTOLIC BLOOD PRESSURE: 59 MMHG | SYSTOLIC BLOOD PRESSURE: 138 MMHG

## 2019-04-14 LAB
ALBUMIN SERPL-MCNC: 3.2 G/DL (ref 3.4–5)
ANION GAP SERPL CALC-SCNC: 13 MMOL/L (ref 6–14)
BASOPHILS # BLD AUTO: 0 X10^3/UL (ref 0–0.2)
BASOPHILS NFR BLD: 0 % (ref 0–3)
BUN SERPL-MCNC: 57 MG/DL (ref 7–20)
CALCIUM SERPL-MCNC: 8.8 MG/DL (ref 8.5–10.1)
CHLORIDE SERPL-SCNC: 98 MMOL/L (ref 98–107)
CO2 SERPL-SCNC: 26 MMOL/L (ref 21–32)
CREAT SERPL-MCNC: 9.8 MG/DL (ref 0.6–1)
EOSINOPHIL NFR BLD: 0.1 X10^3/UL (ref 0–0.7)
EOSINOPHIL NFR BLD: 2 % (ref 0–3)
ERYTHROCYTE [DISTWIDTH] IN BLOOD BY AUTOMATED COUNT: 20.4 % (ref 11.5–14.5)
GFR SERPLBLD BASED ON 1.73 SQ M-ARVRAT: 5.5 ML/MIN
GLUCOSE SERPL-MCNC: 180 MG/DL (ref 70–99)
HCT VFR BLD CALC: 37.9 % (ref 36–47)
HGB BLD-MCNC: 11.7 G/DL (ref 12–15.5)
LYMPHOCYTES # BLD: 2.4 X10^3/UL (ref 1–4.8)
LYMPHOCYTES NFR BLD AUTO: 32 % (ref 24–48)
MCH RBC QN AUTO: 26 PG (ref 25–35)
MCHC RBC AUTO-ENTMCNC: 31 G/DL (ref 31–37)
MCV RBC AUTO: 83 FL (ref 79–100)
MONO #: 0.8 X10^3/UL (ref 0–1.1)
MONOCYTES NFR BLD: 11 % (ref 0–9)
NEUT #: 4 X10^3UL (ref 1.8–7.7)
NEUTROPHILS NFR BLD AUTO: 55 % (ref 31–73)
PHOSPHATE SERPL-MCNC: 7 MG/DL (ref 2.6–4.7)
PLATELET # BLD AUTO: 243 X10^3/UL (ref 140–400)
POTASSIUM SERPL-SCNC: 4.2 MMOL/L (ref 3.5–5.1)
RBC # BLD AUTO: 4.54 X10^6/UL (ref 3.5–5.4)
SODIUM SERPL-SCNC: 137 MMOL/L (ref 136–145)
WBC # BLD AUTO: 7.3 X10^3/UL (ref 4–11)

## 2019-04-14 RX ADMIN — INSULIN LISPRO SCH UNITS: 100 INJECTION, SOLUTION INTRAVENOUS; SUBCUTANEOUS at 12:00

## 2019-04-14 RX ADMIN — HYDRALAZINE HYDROCHLORIDE PRN MG: 20 INJECTION INTRAMUSCULAR; INTRAVENOUS at 03:47

## 2019-04-14 RX ADMIN — INSULIN LISPRO SCH UNITS: 100 INJECTION, SOLUTION INTRAVENOUS; SUBCUTANEOUS at 08:00

## 2019-04-14 NOTE — NUR
PATIENTS IV OUT AND MONITOR OFF. PRESCRIPTION FOR PEPCID GIVEN TO PATIENT. DISCUSSED FOLLOW 
UP WITH PATIENT. PATIENT HAS NO QUESTIONS AT THIS TIME. PATIENT ESCORTED TO PERSONAL 
VEHICLE.

## 2019-04-14 NOTE — PDOC2
CARDIOLOGY CONSULT NOTE


CHEIF COMPLAINT:


Chest discomfort





HPI:


Patient is a pleasant 38-year-old woman with past medical history as noted 

below presenting to the hospital in the setting of some chest discomfort. She 

has a long-standing history of multiple comorbidities as noted and in this 

setting had some discomfort in her chest. She did not associate this discomfort 

with any activity nor was there any associated shortness of breath. She reports 

that this felt like a sharp pain. She does not recall any trauma. No 

significant alleviating or aggravating factors. No association with food. 

Patient had stress testing as noted at [facility] and these were 

reviewed.





Since admission the hospital the patient feels better and wishes to go home. No 

syncope, palpitations, orthopnea or PND. She has been tolerating her dialysis 

well.





PMHX:


1. End-stage renal disease with a left thigh fistula for dialysis


2. Hypertension


3. Dyslipidemia


4. Depression


5. Type 1 diabetes





SOCHX:


No alcohol, tobacco or illicit drug use. She works in sales.





FAMHX:


Notable for kidney disease. No history of sudden cardiac death.





CURRENT MEDS:





Current Medications








 Medications


  (Trade)  Dose


 Ordered  Sig/Mikayla  Start Time


 Stop Time Status Last Admin


Dose Admin


 


 Amlodipine


 Besylate


  (Norvasc)  10 mg  DAILY  4/14/19 09:00


     





 


 Aspirin


  (Children'S


 Aspirin)  324 mg  1X  ONCE  4/13/19 17:45


 4/13/19 17:50 DC 4/13/19 17:45


324 MG


 


 Carvedilol


  (Coreg)  12.5 mg  BIDWMEALS  4/14/19 08:00


     





 


 Citalopram


 Hydrobromide


  (CeleXA)  10 mg  HS  4/13/19 21:00


    4/13/19 21:00


10 MG


 


 Clonidine HCl


  (Catapres)  0.1 mg  1X  ONCE  4/13/19 17:45


 4/13/19 17:50 DC 4/13/19 17:45


0.1 MG


 


 Dextrose


  (Dextrose


 50%-Water Syringe)  12.5 gm  PRN Q15MIN  PRN  4/13/19 21:00


     





 


 Fentanyl Citrate


  (Fentanyl 2ml


 Vial)  25 mcg  1X  ONCE  4/13/19 16:15


 4/13/19 16:16 DC 4/13/19 16:20


25 MCG


 


 Heparin Sodium


  (Porcine)


  (Heparin Sodium)  5,000 unit  Q8HRS  4/14/19 14:00


     





 


 Hydralazine HCl


  (Apresoline Inj)  10 mg  PRN Q4HRS  PRN  4/13/19 18:45


    4/14/19 03:47


10 MG


 


 Insulin Human


 Lispro


  (HumaLOG)  0-5 UNITS  TIDWMEALS  4/14/19 08:00


     





 


 Loperamide HCl


  (Imodium)  2 mg  PRN Q8HRS  PRN  4/13/19 20:00


    4/13/19 20:10


2 MG


 


 Ondansetron HCl


  (Zofran)  4 mg  1X  ONCE  4/13/19 16:00


 4/13/19 16:02 DC 4/13/19 16:05


4 MG


 


 Pantoprazole


 Sodium


  (Protonix)  40 mg  DAILYAC  4/14/19 07:30


     





 


 Sodium Chloride  1,000 ml @ 


 1,000 mls/hr  1X  ONCE  4/13/19 16:15


 4/13/19 17:14 DC 4/13/19 16:15


1,000 MLS/HR











ALLERGIES:





Allergies








Coded Allergies Type Severity Reaction Last Updated Verified


 


  No Known Drug Allergies    4/13/19 No











ROS:


Negative for 10 out of 14 systems reviewed unless otherwise mentioned above in 

history of present illness





PHYSICAL EXAM:


Vital Signs:





Vital Signs








  Date Time  Temp Pulse Resp B/P (MAP) Pulse Ox O2 Delivery O2 Flow Rate FiO2


 


4/14/19 10:52 97.8 72 17 163/80 (107) 98 Room Air  





 97.8       








I & O











Intake and Output 


 


 4/14/19





 07:00


 


Intake Total 700 ml


 


Output Total 300 ml


 


Balance 400 ml


 


 


 


Intake Oral 200 ml


 


IV Total 500 ml


 


Output Urine Total 300 ml


 


# Voids 1








Physical Exam:


The patient appeared well nourished and normally developed. 


Head exam is unremarkable. No scleral icterus or corneal arcus noted. Neck is 

without jugular venous distension, thyromegaly, or carotid bruits. Carotid 

upstrokes are brisk bilaterally. 


Lungs are clear to auscultation and percussion.


 Cardiac exam reveals the PMI to be normally sized and situated. Rhythm is 

regular. First and second heart sounds normal. No murmurs, rubs or gallops. 


Abdominal exam reveals normal bowel sounds, no masses, no organomegaly and no 

aortic enlargement. 


Extremities are nonedematous. L thigh fistula is well functioning. Diminished 

radial pulses. 


Msk: No traumua


Neuro: No focal deficits





DIAGNOSTIC TESTING:


Cardiac enzymes are negative. Chest x-ray is unremarkable


EKG demonstrates no acute findings with mild left axis deviation and possible 

prior anteroseptal infarct although this may be related to lead placement


Stress test in 2017 at [facility] was within normal limits


Echocardiogram in July 2018 at [facility] was within normal limits.





ASSESSMENT:


1. Atypical chest pain likely related to either muscular skeletal etiology or 

neuropathic pain.


2. Hypertension


3. End-stage renal disease





PLAN:


-Patient overall is feeling better since her blood pressures been better 

controlled. Given that she's had negative stress testing and echocardiogram in 

the last one to 2 years with the non-concerning EKG and a negative biomarker 

study okay to discharge from a cardiac standpoint with close follow-up with her 

primary transplant and cardiology team at [facility].





If she wishes to remain in the hospital we could consider repeat echocardiogram 

in the near future otherwise supportive care. Continue dialysis per her usual 

schedule.











IFRAH DOMINGUEZ MD Apr 14, 2019 12:17

## 2019-04-14 NOTE — PDOC3
Discharge Summary


Visit Information


Date of Admission:  Apr 13, 2019


Date of Discharge:  Apr 14, 2019


Admitting Diagnosis:  chest pain


Final Diagnosis


1. chest pain


2. ESRD on dialysis


4. diabetes suboptimal control


5. morbid obesity


6. HYPERTENSION


7. GERD








  








Problems


Medical Problems:


(1) Chest pain


Status: Acute  





(2) Shortness of breath


Status: Acute  








Brief Hospital Course


Allergies





 Allergies








Coded Allergies Type Severity Reaction Last Updated Verified


 


  No Known Drug Allergies    4/13/19 No








Vital Signs





Vital Signs








  Date Time  Temp Pulse Resp B/P (MAP) Pulse Ox O2 Delivery O2 Flow Rate FiO2


 


4/14/19 13:13  72  163/80    


 


4/14/19 10:52 97.8  17  98 Room Air  





 97.8       








Lab Results





Laboratory Tests








Test


 4/13/19


15:55 4/13/19


20:54 4/14/19


03:15 4/14/19


08:06


 


White Blood Count


 9.2 x10^3/uL


(4.0-11.0) 


 7.3 x10^3/uL


(4.0-11.0) 





 


Red Blood Count


 4.63 x10^6/uL


(3.50-5.40) 


 4.54 x10^6/uL


(3.50-5.40) 





 


Hemoglobin


 12.1 g/dL


(12.0-15.5) 


 11.7 g/dL


(12.0-15.5) 





 


Hematocrit


 39.1 %


(36.0-47.0) 


 37.9 %


(36.0-47.0) 





 


Mean Corpuscular Volume 85 fL ()   83 fL ()  


 


Mean Corpuscular Hemoglobin 26 pg (25-35)   26 pg (25-35)  


 


Mean Corpuscular Hemoglobin


Concent 31 g/dL


(31-37) 


 31 g/dL


(31-37) 





 


Red Cell Distribution Width


 19.8 %


(11.5-14.5) 


 20.4 %


(11.5-14.5) 





 


Platelet Count


 277 x10^3/uL


(140-400) 


 243 x10^3/uL


(140-400) 





 


Neutrophils (%) (Auto) 52 % (31-73)   55 % (31-73)  


 


Lymphocytes (%) (Auto) 38 % (24-48)   32 % (24-48)  


 


Monocytes (%) (Auto) 9 % (0-9)   11 % (0-9)  


 


Eosinophils (%) (Auto) 1 % (0-3)   2 % (0-3)  


 


Basophils (%) (Auto) 1 % (0-3)   0 % (0-3)  


 


Neutrophils # (Auto)


 4.8 x10^3uL


(1.8-7.7) 


 4.0 x10^3uL


(1.8-7.7) 





 


Lymphocytes # (Auto)


 3.5 x10^3/uL


(1.0-4.8) 


 2.4 x10^3/uL


(1.0-4.8) 





 


Monocytes # (Auto)


 0.8 x10^3/uL


(0.0-1.1) 


 0.8 x10^3/uL


(0.0-1.1) 





 


Eosinophils # (Auto)


 0.1 x10^3/uL


(0.0-0.7) 


 0.1 x10^3/uL


(0.0-0.7) 





 


Basophils # (Auto)


 0.1 x10^3/uL


(0.0-0.2) 


 0.0 x10^3/uL


(0.0-0.2) 





 


Sodium Level


 137 mmol/L


(136-145) 


 137 mmol/L


(136-145) 





 


Potassium Level


 3.7 mmol/L


(3.5-5.1) 


 4.2 mmol/L


(3.5-5.1) 





 


Chloride Level


 96 mmol/L


() 


 98 mmol/L


() 





 


Carbon Dioxide Level


 25 mmol/L


(21-32) 


 26 mmol/L


(21-32) 





 


Anion Gap 16 (6-14)   13 (6-14)  


 


Blood Urea Nitrogen


 49 mg/dL


(7-20) 


 57 mg/dL


(7-20) 





 


Creatinine


 9.3 mg/dL


(0.6-1.0) 


 9.8 mg/dL


(0.6-1.0) 





 


Estimated GFR


(Cockcroft-Gault) 5.8 


 


 5.5 


 





 


BUN/Creatinine Ratio 5 (6-20)    


 


Glucose Level


 332 mg/dL


(70-99) 


 180 mg/dL


(70-99) 





 


Calcium Level


 9.3 mg/dL


(8.5-10.1) 


 8.8 mg/dL


(8.5-10.1) 





 


Total Bilirubin


 0.4 mg/dL


(0.2-1.0) 


 


 





 


Aspartate Amino Transf


(AST/SGOT) 17 U/L (15-37) 


 


 


 





 


Alanine Aminotransferase


(ALT/SGPT) 14 U/L (14-59) 


 


 


 





 


Alkaline Phosphatase


 58 U/L


() 


 


 





 


Troponin I Quantitative


 < 0.017 ng/mL


(0.000-0.055) 


 0.017 ng/mL


(0.000-0.055) 





 


Total Protein


 8.5 g/dL


(6.4-8.2) 


 


 





 


Albumin


 3.6 g/dL


(3.4-5.0) 


 3.2 g/dL


(3.4-5.0) 





 


Albumin/Globulin Ratio 0.7 (1.0-1.7)    


 


Lipase


 167 U/L


() 


 


 





 


Glucose (Fingerstick)


 


 295 mg/dL


(70-99) 


 188 mg/dL


(70-99)


 


Phosphorus Level


 


 


 7.0 mg/dL


(2.6-4.7) 





 


Test


 4/14/19


11:53 


 


 





 


Glucose (Fingerstick)


 150 mg/dL


(70-99) 


 


 











Laboratory Tests








Test


 4/13/19


15:55 4/13/19


20:54 4/14/19


03:15 4/14/19


08:06


 


White Blood Count


 9.2 x10^3/uL


(4.0-11.0) 


 7.3 x10^3/uL


(4.0-11.0) 





 


Red Blood Count


 4.63 x10^6/uL


(3.50-5.40) 


 4.54 x10^6/uL


(3.50-5.40) 





 


Hemoglobin


 12.1 g/dL


(12.0-15.5) 


 11.7 g/dL


(12.0-15.5) 





 


Hematocrit


 39.1 %


(36.0-47.0) 


 37.9 %


(36.0-47.0) 





 


Mean Corpuscular Volume 85 fL ()   83 fL ()  


 


Mean Corpuscular Hemoglobin 26 pg (25-35)   26 pg (25-35)  


 


Mean Corpuscular Hemoglobin


Concent 31 g/dL


(31-37) 


 31 g/dL


(31-37) 





 


Red Cell Distribution Width


 19.8 %


(11.5-14.5) 


 20.4 %


(11.5-14.5) 





 


Platelet Count


 277 x10^3/uL


(140-400) 


 243 x10^3/uL


(140-400) 





 


Neutrophils (%) (Auto) 52 % (31-73)   55 % (31-73)  


 


Lymphocytes (%) (Auto) 38 % (24-48)   32 % (24-48)  


 


Monocytes (%) (Auto) 9 % (0-9)   11 % (0-9)  


 


Eosinophils (%) (Auto) 1 % (0-3)   2 % (0-3)  


 


Basophils (%) (Auto) 1 % (0-3)   0 % (0-3)  


 


Neutrophils # (Auto)


 4.8 x10^3uL


(1.8-7.7) 


 4.0 x10^3uL


(1.8-7.7) 





 


Lymphocytes # (Auto)


 3.5 x10^3/uL


(1.0-4.8) 


 2.4 x10^3/uL


(1.0-4.8) 





 


Monocytes # (Auto)


 0.8 x10^3/uL


(0.0-1.1) 


 0.8 x10^3/uL


(0.0-1.1) 





 


Eosinophils # (Auto)


 0.1 x10^3/uL


(0.0-0.7) 


 0.1 x10^3/uL


(0.0-0.7) 





 


Basophils # (Auto)


 0.1 x10^3/uL


(0.0-0.2) 


 0.0 x10^3/uL


(0.0-0.2) 





 


Sodium Level


 137 mmol/L


(136-145) 


 137 mmol/L


(136-145) 





 


Potassium Level


 3.7 mmol/L


(3.5-5.1) 


 4.2 mmol/L


(3.5-5.1) 





 


Chloride Level


 96 mmol/L


() 


 98 mmol/L


() 





 


Carbon Dioxide Level


 25 mmol/L


(21-32) 


 26 mmol/L


(21-32) 





 


Anion Gap 16 (6-14)   13 (6-14)  


 


Blood Urea Nitrogen


 49 mg/dL


(7-20) 


 57 mg/dL


(7-20) 





 


Creatinine


 9.3 mg/dL


(0.6-1.0) 


 9.8 mg/dL


(0.6-1.0) 





 


Estimated GFR


(Cockcroft-Gault) 5.8 


 


 5.5 


 





 


BUN/Creatinine Ratio 5 (6-20)    


 


Glucose Level


 332 mg/dL


(70-99) 


 180 mg/dL


(70-99) 





 


Calcium Level


 9.3 mg/dL


(8.5-10.1) 


 8.8 mg/dL


(8.5-10.1) 





 


Total Bilirubin


 0.4 mg/dL


(0.2-1.0) 


 


 





 


Aspartate Amino Transf


(AST/SGOT) 17 U/L (15-37) 


 


 


 





 


Alanine Aminotransferase


(ALT/SGPT) 14 U/L (14-59) 


 


 


 





 


Alkaline Phosphatase


 58 U/L


() 


 


 





 


Troponin I Quantitative


 < 0.017 ng/mL


(0.000-0.055) 


 0.017 ng/mL


(0.000-0.055) 





 


Total Protein


 8.5 g/dL


(6.4-8.2) 


 


 





 


Albumin


 3.6 g/dL


(3.4-5.0) 


 3.2 g/dL


(3.4-5.0) 





 


Albumin/Globulin Ratio 0.7 (1.0-1.7)    


 


Lipase


 167 U/L


() 


 


 





 


Glucose (Fingerstick)


 


 295 mg/dL


(70-99) 


 188 mg/dL


(70-99)


 


Phosphorus Level


 


 


 7.0 mg/dL


(2.6-4.7) 





 


Test


 4/14/19


11:53 


 


 





 


Glucose (Fingerstick)


 150 mg/dL


(70-99) 


 


 











Brief Hospital Course


Ms. De Dios  is a 38 old  admti with chest pain, GERD





Discharge Information


Condition at Discharge:  Improved


Follow Up:  Weeks


Disposition/Orders:  D/C to Home


Scheduled


Amlodipine Besylate (Amlodipine Besylate) 10 Mg Tablet, 10 MG PO DAILY for htn, 

(Reported)


   Entered as Reported by: KERRI GOMES RN on 4/13/19 1829


   Last Taken: Unknown Dose on 4/5/19      Last Action: Continued on 4/13/19 1844 by KERRI GOMES RN


Carvedilol (Coreg  **) 12.5 Mg Tablet, 12.5 MG PO BIDWMEALS for CARDIAC, (

Reported)


   Entered as Reported by: KERRI GOMES RN on 4/13/19 1834


   Last Taken: Unknown Dose on 4/5/19      Last Action: Continued on 4/13/19 1844 by KERRI GOMES RN


Citalopram Hydrobromide (Celexa) 10 Mg Tablet, 1 TAB PO DAILY for anxiety/

depression, #30 Ref 2 (Reported)


   Entered as Reported by: KERRI GOMES RN on 4/13/19 1834


   Last Taken: Unknown Dose on 4/12/19      Last Action: Continued on 4/13/19 1844 by KERRI GOMES RN


Famotidine (Pepcid) 20 Mg Tablet, 20 MG PO HS for heartburn, #30


   Prescribed by: MOIRA VAUGHN on 4/14/19 1335





Patient Instructions


Patient Instructions


ABd US  The gallbladder is unremarkable without evidence of stones, wall 


thickening or pericholecystic fluid. There is no sonographic Peguero sign. on 

sono





face to face 


< 30 min











MOIRA VAUGHN MD Apr 14, 2019 13:37

## 2019-04-14 NOTE — PDOC2
CONSULT


Date of Consult


Date of Consult


DATE: 4/14/19 


TIME: 11:52





Reason for Consult


Reason for Consult:


ESRD





Referring Physician


Referring Physician:


LOREN





Identification/Chief Complaint


Chief Complaint


CHEST PAIN





Source


Source:  Chart review, Patient





History of Present Illness


Reason for Visit:


THIS IS A 38 YR OLD PT WITH ESRD AND HAS BEEN ON HD SINCE 2016. ESRD DUE TO DM 

I AND HTN HX. HAS AN AV ACCESS IN HER THIGH FOR DIALYSIS. ADMITTED WITH CHEST 

PAIN AND IS NOW UNDERGOING CARDIOLOGY EVALUATION. SHE USUALLY SEES [facility] 

NEPHROLOGY FOR HER RENAL CARE. LABS ARE C/W ESRD. ALSO HAD SOME RUQ PAIN WHICH 

HAS RESOLVED SINCE ADMIT, IMAGING WAS UNREMARKABLE. USUALLY HAS OP HD ON TTS. 

THIS PAST WEEK SHE WAS UNABLE TO KEEP HER THUR APPT DUE TO A CONFLICT AND SO 

DID OP HD ON FRIDAY





Past Medical History


Cardiovascular:  HTN


Psych:  Depression


Renal/:  Chronic renal failure


Endocrine:  Diabetes, Hyperparathyroidism





Past Surgical History


Past Surgical History


THIGH AV ACCESS FOR HD





Family History


Family History:  High Cholestrol, Hypertension





Social History


No


ALCOHOL:  none


Drugs:  None


Lives:  with Family





Current Problem List


Problem List


Problems


Medical Problems:


(1) Chest pain


Status: Acute  





(2) Shortness of breath


Status: Acute  











Current Medications


Current Medications





Current Medications


Ondansetron HCl (Zofran) 4 mg 1X  ONCE IV  Last administered on 4/13/19at 16:05

;  Start 4/13/19 at 16:00;  Stop 4/13/19 at 16:02;  Status DC


Sodium Chloride 1,000 ml @  1,000 mls/hr 1X  ONCE IV  Last administered on 4/13/ 19at 16:15;  Start 4/13/19 at 16:15;  Stop 4/13/19 at 17:14;  Status DC


Fentanyl Citrate (Fentanyl 2ml Vial) 25 mcg 1X  ONCE IV  Last administered on 4/ 13/19at 16:20;  Start 4/13/19 at 16:15;  Stop 4/13/19 at 16:16;  Status DC


Clonidine HCl (Catapres) 0.1 mg 1X  ONCE PO  Last administered on 4/13/19at 17:

45;  Start 4/13/19 at 17:45;  Stop 4/13/19 at 17:50;  Status DC


Aspirin (Children'S Aspirin) 324 mg 1X  ONCE PO  Last administered on 4/13/19at 

17:45;  Start 4/13/19 at 17:45;  Stop 4/13/19 at 17:50;  Status DC


Hydralazine HCl (Apresoline Inj) 10 mg PRN Q4HRS  PRN IVP ELEVATED BP, SEE 

COMMENTS Last administered on 4/14/19at 03:47;  Start 4/13/19 at 18:45


Amlodipine Besylate (Norvasc) 10 mg DAILY PO ;  Start 4/14/19 at 09:00


Carvedilol (Coreg) 12.5 mg BIDWMEALS PO ;  Start 4/14/19 at 08:00


Citalopram Hydrobromide (CeleXA) 10 mg DAILY PO ;  Start 4/14/19 at 09:00;  

Stop 4/14/19 at 09:00;  Status DC


Citalopram Hydrobromide (CeleXA) 10 mg HS PO  Last administered on 4/13/19at 21:

00;  Start 4/13/19 at 21:00


Loperamide HCl (Imodium) 2 mg PRN Q8HRS  PRN PO DIARRHEA Last administered on 4/ 13/19at 20:10;  Start 4/13/19 at 20:00


Insulin Human Lispro (HumaLOG) 0-5 UNITS TIDWMEALS SQ ;  Start 4/14/19 at 08:00


Dextrose (Dextrose 50%-Water Syringe) 12.5 gm PRN Q15MIN  PRN IV SEE COMMENTS;  

Start 4/13/19 at 21:00


Pantoprazole Sodium (Protonix) 40 mg DAILYAC PO ;  Start 4/14/19 at 07:30


Heparin Sodium (Porcine) (Heparin Sodium) 5,000 unit Q8HRS SQ ;  Start 4/14/19 

at 14:00





Active Scripts


Active


Reported


Celexa (Citalopram Hydrobromide) 10 Mg Tablet 1 Tab PO DAILY


Coreg  ** (Carvedilol) 12.5 Mg Tablet 12.5 Mg PO BIDWMEALS


Amlodipine Besylate 10 Mg Tablet 10 Mg PO DAILY





Allergies


Allergies:  


Coded Allergies:  


     No Known Drug Allergies (Unverified , 4/13/19)





ROS


General:  YES: Fatigue, Appetite


PSYCHOLOGICAL ROS:  YES: Anxiety, Depression


Eyes:  Yes Decreased vision


ALLERGY AND IMMUNOLOGY:  YES: Seasonal Allergies


Respiratory:  YES: Cough, Shortness of breath


Cardiovascular:  yes Chest Pain


Gastrointestinal:  Yes Constipation


Genitourinary:  YES Other (ANURIA)


Musculoskeletal:  Yes Muscular Weakness


Neurological:  Yes Weakness


Skin:  Yes Dry Skin





Physical Exam


General:  Alert, Oriented X3, Cooperative, No acute distress


HEENT:  Atraumatic, PERRLA, EOMI, Mucous membr. moist/pink


Lungs:  Clear to auscultation, Normal air movement


Heart:  Regular rate, Normal S1, Normal S2


Abdomen:  Normal bowel sounds, Soft, No tenderness


Extremities:  No clubbing, No cyanosis


Skin:  No breakdown, No significant lesion


Neuro:  Normal speech


Psych/Mental Status:  Mental status NL, Mood NL


MUSCULOSKELETAL:  No joint tenderness, No deformity, No swelling





Vitals


VITALS





Vital Signs








  Date Time  Temp Pulse Resp B/P (MAP) Pulse Ox O2 Delivery O2 Flow Rate FiO2


 


4/14/19 10:52 97.8 72 17 163/80 (107) 98 Room Air  





 97.8       











Labs


Labs





Laboratory Tests








Test


 4/13/19


15:55 4/13/19


20:54 4/14/19


03:15 4/14/19


08:06


 


White Blood Count


 9.2 x10^3/uL


(4.0-11.0) 


 7.3 x10^3/uL


(4.0-11.0) 





 


Red Blood Count


 4.63 x10^6/uL


(3.50-5.40) 


 4.54 x10^6/uL


(3.50-5.40) 





 


Hemoglobin


 12.1 g/dL


(12.0-15.5) 


 11.7 g/dL


(12.0-15.5) 





 


Hematocrit


 39.1 %


(36.0-47.0) 


 37.9 %


(36.0-47.0) 





 


Mean Corpuscular Volume 85 fL ()   83 fL ()  


 


Mean Corpuscular Hemoglobin 26 pg (25-35)   26 pg (25-35)  


 


Mean Corpuscular Hemoglobin


Concent 31 g/dL


(31-37) 


 31 g/dL


(31-37) 





 


Red Cell Distribution Width


 19.8 %


(11.5-14.5) 


 20.4 %


(11.5-14.5) 





 


Platelet Count


 277 x10^3/uL


(140-400) 


 243 x10^3/uL


(140-400) 





 


Neutrophils (%) (Auto) 52 % (31-73)   55 % (31-73)  


 


Lymphocytes (%) (Auto) 38 % (24-48)   32 % (24-48)  


 


Monocytes (%) (Auto) 9 % (0-9)   11 % (0-9)  


 


Eosinophils (%) (Auto) 1 % (0-3)   2 % (0-3)  


 


Basophils (%) (Auto) 1 % (0-3)   0 % (0-3)  


 


Neutrophils # (Auto)


 4.8 x10^3uL


(1.8-7.7) 


 4.0 x10^3uL


(1.8-7.7) 





 


Lymphocytes # (Auto)


 3.5 x10^3/uL


(1.0-4.8) 


 2.4 x10^3/uL


(1.0-4.8) 





 


Monocytes # (Auto)


 0.8 x10^3/uL


(0.0-1.1) 


 0.8 x10^3/uL


(0.0-1.1) 





 


Eosinophils # (Auto)


 0.1 x10^3/uL


(0.0-0.7) 


 0.1 x10^3/uL


(0.0-0.7) 





 


Basophils # (Auto)


 0.1 x10^3/uL


(0.0-0.2) 


 0.0 x10^3/uL


(0.0-0.2) 





 


Sodium Level


 137 mmol/L


(136-145) 


 137 mmol/L


(136-145) 





 


Potassium Level


 3.7 mmol/L


(3.5-5.1) 


 4.2 mmol/L


(3.5-5.1) 





 


Chloride Level


 96 mmol/L


() 


 98 mmol/L


() 





 


Carbon Dioxide Level


 25 mmol/L


(21-32) 


 26 mmol/L


(21-32) 





 


Anion Gap 16 (6-14)   13 (6-14)  


 


Blood Urea Nitrogen


 49 mg/dL


(7-20) 


 57 mg/dL


(7-20) 





 


Creatinine


 9.3 mg/dL


(0.6-1.0) 


 9.8 mg/dL


(0.6-1.0) 





 


Estimated GFR


(Cockcroft-Gault) 5.8 


 


 5.5 


 





 


BUN/Creatinine Ratio 5 (6-20)    


 


Glucose Level


 332 mg/dL


(70-99) 


 180 mg/dL


(70-99) 





 


Calcium Level


 9.3 mg/dL


(8.5-10.1) 


 8.8 mg/dL


(8.5-10.1) 





 


Total Bilirubin


 0.4 mg/dL


(0.2-1.0) 


 


 





 


Aspartate Amino Transf


(AST/SGOT) 17 U/L (15-37) 


 


 


 





 


Alanine Aminotransferase


(ALT/SGPT) 14 U/L (14-59) 


 


 


 





 


Alkaline Phosphatase


 58 U/L


() 


 


 





 


Troponin I Quantitative


 < 0.017 ng/mL


(0.000-0.055) 


 0.017 ng/mL


(0.000-0.055) 





 


Total Protein


 8.5 g/dL


(6.4-8.2) 


 


 





 


Albumin


 3.6 g/dL


(3.4-5.0) 


 3.2 g/dL


(3.4-5.0) 





 


Albumin/Globulin Ratio 0.7 (1.0-1.7)    


 


Lipase


 167 U/L


() 


 


 





 


Glucose (Fingerstick)


 


 295 mg/dL


(70-99) 


 188 mg/dL


(70-99)


 


Phosphorus Level


 


 


 7.0 mg/dL


(2.6-4.7) 











Laboratory Tests








Test


 4/13/19


15:55 4/13/19


20:54 4/14/19


03:15 4/14/19


08:06


 


White Blood Count


 9.2 x10^3/uL


(4.0-11.0) 


 7.3 x10^3/uL


(4.0-11.0) 





 


Red Blood Count


 4.63 x10^6/uL


(3.50-5.40) 


 4.54 x10^6/uL


(3.50-5.40) 





 


Hemoglobin


 12.1 g/dL


(12.0-15.5) 


 11.7 g/dL


(12.0-15.5) 





 


Hematocrit


 39.1 %


(36.0-47.0) 


 37.9 %


(36.0-47.0) 





 


Mean Corpuscular Volume 85 fL ()   83 fL ()  


 


Mean Corpuscular Hemoglobin 26 pg (25-35)   26 pg (25-35)  


 


Mean Corpuscular Hemoglobin


Concent 31 g/dL


(31-37) 


 31 g/dL


(31-37) 





 


Red Cell Distribution Width


 19.8 %


(11.5-14.5) 


 20.4 %


(11.5-14.5) 





 


Platelet Count


 277 x10^3/uL


(140-400) 


 243 x10^3/uL


(140-400) 





 


Neutrophils (%) (Auto) 52 % (31-73)   55 % (31-73)  


 


Lymphocytes (%) (Auto) 38 % (24-48)   32 % (24-48)  


 


Monocytes (%) (Auto) 9 % (0-9)   11 % (0-9)  


 


Eosinophils (%) (Auto) 1 % (0-3)   2 % (0-3)  


 


Basophils (%) (Auto) 1 % (0-3)   0 % (0-3)  


 


Neutrophils # (Auto)


 4.8 x10^3uL


(1.8-7.7) 


 4.0 x10^3uL


(1.8-7.7) 





 


Lymphocytes # (Auto)


 3.5 x10^3/uL


(1.0-4.8) 


 2.4 x10^3/uL


(1.0-4.8) 





 


Monocytes # (Auto)


 0.8 x10^3/uL


(0.0-1.1) 


 0.8 x10^3/uL


(0.0-1.1) 





 


Eosinophils # (Auto)


 0.1 x10^3/uL


(0.0-0.7) 


 0.1 x10^3/uL


(0.0-0.7) 





 


Basophils # (Auto)


 0.1 x10^3/uL


(0.0-0.2) 


 0.0 x10^3/uL


(0.0-0.2) 





 


Sodium Level


 137 mmol/L


(136-145) 


 137 mmol/L


(136-145) 





 


Potassium Level


 3.7 mmol/L


(3.5-5.1) 


 4.2 mmol/L


(3.5-5.1) 





 


Chloride Level


 96 mmol/L


() 


 98 mmol/L


() 





 


Carbon Dioxide Level


 25 mmol/L


(21-32) 


 26 mmol/L


(21-32) 





 


Anion Gap 16 (6-14)   13 (6-14)  


 


Blood Urea Nitrogen


 49 mg/dL


(7-20) 


 57 mg/dL


(7-20) 





 


Creatinine


 9.3 mg/dL


(0.6-1.0) 


 9.8 mg/dL


(0.6-1.0) 





 


Estimated GFR


(Cockcroft-Gault) 5.8 


 


 5.5 


 





 


BUN/Creatinine Ratio 5 (6-20)    


 


Glucose Level


 332 mg/dL


(70-99) 


 180 mg/dL


(70-99) 





 


Calcium Level


 9.3 mg/dL


(8.5-10.1) 


 8.8 mg/dL


(8.5-10.1) 





 


Total Bilirubin


 0.4 mg/dL


(0.2-1.0) 


 


 





 


Aspartate Amino Transf


(AST/SGOT) 17 U/L (15-37) 


 


 


 





 


Alanine Aminotransferase


(ALT/SGPT) 14 U/L (14-59) 


 


 


 





 


Alkaline Phosphatase


 58 U/L


() 


 


 





 


Troponin I Quantitative


 < 0.017 ng/mL


(0.000-0.055) 


 0.017 ng/mL


(0.000-0.055) 





 


Total Protein


 8.5 g/dL


(6.4-8.2) 


 


 





 


Albumin


 3.6 g/dL


(3.4-5.0) 


 3.2 g/dL


(3.4-5.0) 





 


Albumin/Globulin Ratio 0.7 (1.0-1.7)    


 


Lipase


 167 U/L


() 


 


 





 


Glucose (Fingerstick)


 


 295 mg/dL


(70-99) 


 188 mg/dL


(70-99)


 


Phosphorus Level


 


 


 7.0 mg/dL


(2.6-4.7) 














Images


Images


HISTORY: Right upper quadrant pain.


 


COMPARISON: None.


 


FINDINGS: Sonographic evaluation of the right upper quadrant was 


performed.


 


The liver appears normal in parenchymal echotexture. There are no focal 


lesions. 


 


The gallbladder is unremarkable without evidence of stones, wall 


thickening or pericholecystic fluid. There is no sonographic Peguero sign. 


The common duct measures 5 mm. The visualized portions of the head of the 


pancreas reveal no abnormality.


 


The right kidney measures 8.7 cm. Cortical thickness is preserved. 


Cortical echogenicity is mildly increased. There is no hydronephrosis.


 


The visualized portions of the abdominal aorta and inferior vena cava are 


grossly patent and normal in caliber.


 


IMPRESSION:


1. No cause for pain is identified.


2. Increased right renal cortical echogenicity is consistent with 


intrinsic renal disease.





Assessment/Plan


Assessment/Plan


IMP





CHEST PAIN


ESRD


DM I


HTN


ANEMIA





PLAN





CARDIOLOGY EVALUATION


HOME MEDS


NORVASC STARTED TODAY


HD TOMORROW THEN TTS


WILL FOLLOW











BRITNEY MEYER MD Apr 14, 2019 11:58

## 2019-04-15 NOTE — EKG
[facility]

              [address] [place] [address]

Test Date:    2019               Test Time:    15:47:17

Pat Name:     TINA ERICKSON       Department:   

Patient ID:   [id number]           Room:         211 1

Gender:       F                        Technician:   

:          1980               Requested By: PRECIOUS RICHARDS

Order Number: [id number]           Reading MD:   Adrian Lewis

                                 Measurements

Intervals                              Axis          

Rate:         88                       P:            -72

OR:           170                      QRS:          -16

QRSD:         66                       T:            87

QT:           418                                    

QTc:          510                                    

                           Interpretive Statements

SINUS RHYTHM

LEFTWARD AXIS

CONSIDER LEFT VENTRICULAR HYPERTROPHY

QRS(T) CONTOUR ABNORMALITY

CONSIDER ANTEROSEPTAL INFARCT



Electronically Signed On 2019 12:41:35 CDT by Adrian Lweis

## 2019-10-02 ENCOUNTER — HOSPITAL ENCOUNTER (EMERGENCY)
Dept: HOSPITAL 61 - ER | Age: 39
Discharge: HOME | End: 2019-10-02
Payer: MEDICARE

## 2019-10-02 VITALS — HEIGHT: 66 IN | BODY MASS INDEX: 33.59 KG/M2 | WEIGHT: 209 LBS

## 2019-10-02 VITALS — SYSTOLIC BLOOD PRESSURE: 198 MMHG | DIASTOLIC BLOOD PRESSURE: 95 MMHG

## 2019-10-02 DIAGNOSIS — E11.9: ICD-10-CM

## 2019-10-02 DIAGNOSIS — R10.31: Primary | ICD-10-CM

## 2019-10-02 DIAGNOSIS — R11.2: ICD-10-CM

## 2019-10-02 DIAGNOSIS — Z98.890: ICD-10-CM

## 2019-10-02 DIAGNOSIS — Z91.040: ICD-10-CM

## 2019-10-02 DIAGNOSIS — N28.9: ICD-10-CM

## 2019-10-02 DIAGNOSIS — I10: ICD-10-CM

## 2019-10-02 LAB
ALBUMIN SERPL-MCNC: 3 G/DL (ref 3.4–5)
ALBUMIN/GLOB SERPL: 0.6 {RATIO} (ref 1–1.7)
ALP SERPL-CCNC: 61 U/L (ref 46–116)
ALT SERPL-CCNC: 19 U/L (ref 14–59)
ANION GAP SERPL CALC-SCNC: 12 MMOL/L (ref 6–14)
APTT PPP: YELLOW S
AST SERPL-CCNC: 18 U/L (ref 15–37)
BACTERIA #/AREA URNS HPF: (no result) /HPF
BASOPHILS # BLD AUTO: 0 X10^3/UL (ref 0–0.2)
BASOPHILS NFR BLD: 1 % (ref 0–3)
BILIRUB SERPL-MCNC: 0.3 MG/DL (ref 0.2–1)
BILIRUB UR QL STRIP: NEGATIVE
BUN SERPL-MCNC: 73 MG/DL (ref 7–20)
BUN/CREAT SERPL: 6 (ref 6–20)
CALCIUM SERPL-MCNC: 8.6 MG/DL (ref 8.5–10.1)
CHLORIDE SERPL-SCNC: 103 MMOL/L (ref 98–107)
CO2 SERPL-SCNC: 25 MMOL/L (ref 21–32)
CREAT SERPL-MCNC: 11.7 MG/DL (ref 0.6–1)
EOSINOPHIL NFR BLD: 0.1 X10^3/UL (ref 0–0.7)
EOSINOPHIL NFR BLD: 1 % (ref 0–3)
ERYTHROCYTE [DISTWIDTH] IN BLOOD BY AUTOMATED COUNT: 18 % (ref 11.5–14.5)
FIBRINOGEN PPP-MCNC: CLEAR MG/DL
GFR SERPLBLD BASED ON 1.73 SQ M-ARVRAT: 4.4 ML/MIN
GLOBULIN SER-MCNC: 5.1 G/DL (ref 2.2–3.8)
GLUCOSE SERPL-MCNC: 103 MG/DL (ref 70–99)
HCT VFR BLD CALC: 28.3 % (ref 36–47)
HGB BLD-MCNC: 9.3 G/DL (ref 12–15.5)
LYMPHOCYTES # BLD: 1.9 X10^3/UL (ref 1–4.8)
LYMPHOCYTES NFR BLD AUTO: 24 % (ref 24–48)
MCH RBC QN AUTO: 27 PG (ref 25–35)
MCHC RBC AUTO-ENTMCNC: 33 G/DL (ref 31–37)
MCV RBC AUTO: 83 FL (ref 79–100)
MONO #: 0.6 X10^3/UL (ref 0–1.1)
MONOCYTES NFR BLD: 7 % (ref 0–9)
NEUT #: 5.3 X10^3/UL (ref 1.8–7.7)
NEUTROPHILS NFR BLD AUTO: 67 % (ref 31–73)
NITRITE UR QL STRIP: NEGATIVE
PH UR STRIP: 7.5 [PH]
PLATELET # BLD AUTO: 193 X10^3/UL (ref 140–400)
POTASSIUM SERPL-SCNC: 4.6 MMOL/L (ref 3.5–5.1)
PROT SERPL-MCNC: 8.1 G/DL (ref 6.4–8.2)
PROT UR STRIP-MCNC: >=300 MG/DL
RBC # BLD AUTO: 3.43 X10^6/UL (ref 3.5–5.4)
RBC #/AREA URNS HPF: (no result) /HPF (ref 0–2)
SODIUM SERPL-SCNC: 140 MMOL/L (ref 136–145)
SQUAMOUS #/AREA URNS LPF: (no result) /LPF
U PREG PATIENT: NEGATIVE
UROBILINOGEN UR-MCNC: 0.2 MG/DL
WBC # BLD AUTO: 7.9 X10^3/UL (ref 4–11)
WBC #/AREA URNS HPF: (no result) /HPF (ref 0–4)

## 2019-10-02 PROCEDURE — 36415 COLL VENOUS BLD VENIPUNCTURE: CPT

## 2019-10-02 PROCEDURE — 96374 THER/PROPH/DIAG INJ IV PUSH: CPT

## 2019-10-02 PROCEDURE — 74177 CT ABD & PELVIS W/CONTRAST: CPT

## 2019-10-02 PROCEDURE — 81001 URINALYSIS AUTO W/SCOPE: CPT

## 2019-10-02 PROCEDURE — 99285 EMERGENCY DEPT VISIT HI MDM: CPT

## 2019-10-02 PROCEDURE — 80053 COMPREHEN METABOLIC PANEL: CPT

## 2019-10-02 PROCEDURE — 96375 TX/PRO/DX INJ NEW DRUG ADDON: CPT

## 2019-10-02 PROCEDURE — 81025 URINE PREGNANCY TEST: CPT

## 2019-10-02 PROCEDURE — 85025 COMPLETE CBC W/AUTO DIFF WBC: CPT

## 2019-10-02 RX ADMIN — HYDRALAZINE HYDROCHLORIDE ONE MG: 20 INJECTION INTRAMUSCULAR; INTRAVENOUS at 19:00

## 2019-10-02 RX ADMIN — HYDRALAZINE HYDROCHLORIDE ONE MG: 20 INJECTION INTRAMUSCULAR; INTRAVENOUS at 20:22

## 2019-10-02 NOTE — PHYS DOC
Past Medical History


Past Medical History:  Diabetes-Type II, Hypertension, Renal Failure


Past Surgical History:  


Additional Past Surgical Histo:  PERITONEAL SURGERY


Alcohol Use:  None


Drug Use:  None





Adult General


Chief Complaint


Chief Complaint:  ABDOMINAL PAIN





HPI


HPI





39-year-old female presents to the emergency department with complaints of right

lower quadrant abdominal pain. Patient states that 2 weeks ago she had similar 

complaints she was seen at  with CT scan and ultrasound performed which was 

unremarkable. Denies any fevers however has had some intermittent chills. She as

well describes 2 days of vomiting.  She describes the pain as stabbing. She 

states movement, coughing makes her pain worse. She denies any vaginal 

discharge. Patient has underlying history of HTN





Review of Systems


Review of Systems





Constitutional: intermittent fever/chills


Respiratory: Denies cough or shortness of breath []


Cardiovascular: No additional information not addressed in HPI []


GI: RLQ abdominal pain, vomiting


: Denies dysuria or hematuria []


Musculoskeletal: Denies back pain or joint pain []


Neurologic: Denies headache, focal weakness or sensory changes []





All other systems were reviewed and found to be within normal limits, except as 

documented in this note.





Current Medications


Current Medications





Current Medications








 Medications


  (Trade)  Dose


 Ordered  Sig/Mikayla  Start Time


 Stop Time Status Last Admin


Dose Admin


 


 Hydralazine HCl


  (Apresoline Inj)  10 mg  1X  ONCE  10/2/19 19:00


 10/2/19 19:01 DC  





 


 Iohexol


  (Omnipaque 300


 Mg/ml)  100 ml  STK-MED ONCE  10/2/19 20:58


 10/2/19 20:59 DC  





 


 Morphine Sulfate


  (Morphine


 Sulfate)  4 mg  1X  ONCE  10/2/19 19:00


 10/2/19 19:01 DC 10/2/19 20:22


4 MG


 


 Ondansetron HCl


  (Zofran)  4 mg  1X  ONCE  10/2/19 19:00


 10/2/19 19:01 DC 10/2/19 20:21


4 MG











Allergies


Allergies





Allergies








Coded Allergies Type Severity Reaction Last Updated Verified


 


  latex Allergy Intermediate RASH 19 Yes











Physical Exam


Physical Exam





Constitutional: Well developed, well nourished, mild distress 2/2 pain, non-

toxic appearance. []


HENT: Normocephalic, atraumatic, bilateral external ears normal, oropharynx 

moist, no oral exudates, nose normal. []


Eyes: PERRLA, EOMI, conjunctiva normal, no discharge. [] 


Cardiovascular:Heart rate regular rhythm, no murmur []


Lungs & Thorax:  Bilateral breath sounds clear to auscultation []


Abdomen: TTP RLQ, + mcburney tenderness on exam, + rovsings sign, no masses, no 

pulsatile masses. [] 


Skin: Warm, dry, no erythema, no rash. [] 


Back: No tenderness, no CVA tenderness. [] 


Extremities: No tenderness, no edema. [] 


Neurologic: Alert and oriented X 3, no focal deficits noted. []


Psychologic: Affect normal, judgement normal, mood normal. []





Current Patient Data


Vital Signs





                                   Vital Signs








  Date Time  Temp Pulse Resp B/P (MAP) Pulse Ox O2 Delivery O2 Flow Rate FiO2


 


10/2/19 20:22      Room Air  


 


10/2/19 18:16 97.8 73 20 244/115 (158) 100   





 97.8       








Lab Values





                                Laboratory Tests








Test


 10/2/19


19:48 10/2/19


20:32


 


White Blood Count


 7.9 x10^3/uL


(4.0-11.0) 





 


Red Blood Count


 3.43 x10^6/uL


(3.50-5.40)  L 





 


Hemoglobin


 9.3 g/dL


(12.0-15.5)  L 





 


Hematocrit


 28.3 %


(36.0-47.0)  L 





 


Mean Corpuscular Volume


 83 fL ()


 





 


Mean Corpuscular Hemoglobin 27 pg (25-35)   


 


Mean Corpuscular Hemoglobin


Concent 33 g/dL


(31-37) 





 


Red Cell Distribution Width


 18.0 %


(11.5-14.5)  H 





 


Platelet Count


 193 x10^3/uL


(140-400) 





 


Neutrophils (%) (Auto) 67 % (31-73)   


 


Lymphocytes (%) (Auto) 24 % (24-48)   


 


Monocytes (%) (Auto) 7 % (0-9)   


 


Eosinophils (%) (Auto) 1 % (0-3)   


 


Basophils (%) (Auto) 1 % (0-3)   


 


Neutrophils # (Auto)


 5.3 x10^3/uL


(1.8-7.7) 





 


Lymphocytes # (Auto)


 1.9 x10^3/uL


(1.0-4.8) 





 


Monocytes # (Auto)


 0.6 x10^3/uL


(0.0-1.1) 





 


Eosinophils # (Auto)


 0.1 x10^3/uL


(0.0-0.7) 





 


Basophils # (Auto)


 0.0 x10^3/uL


(0.0-0.2) 





 


Sodium Level


 140 mmol/L


(136-145) 





 


Potassium Level


 4.6 mmol/L


(3.5-5.1) 





 


Chloride Level


 103 mmol/L


() 





 


Carbon Dioxide Level


 25 mmol/L


(21-32) 





 


Anion Gap 12 (6-14)   


 


Blood Urea Nitrogen


 73 mg/dL


(7-20)  H 





 


Creatinine


 11.7 mg/dL


(0.6-1.0)  H 





 


Estimated GFR


(Cockcroft-Gault) 4.4  


 





 


BUN/Creatinine Ratio 6 (6-20)   


 


Glucose Level


 103 mg/dL


(70-99)  H 





 


Calcium Level


 8.6 mg/dL


(8.5-10.1) 





 


Total Bilirubin


 0.3 mg/dL


(0.2-1.0) 





 


Aspartate Amino Transferase


(AST) 18 U/L (15-37)


 





 


Alanine Aminotransferase (ALT)


 19 U/L (14-59)


 





 


Alkaline Phosphatase


 61 U/L


() 





 


Total Protein


 8.1 g/dL


(6.4-8.2) 





 


Albumin


 3.0 g/dL


(3.4-5.0)  L 





 


Albumin/Globulin Ratio


 0.6 (1.0-1.7)


L 





 


Urine Collection Type  Void  


 


Urine Color  Yellow  


 


Urine Clarity  Clear  


 


Urine pH  7.5  


 


Urine Specific Gravity  1.020  


 


Urine Protein


 


 >=300 mg/dL


(NEG-TRACE)


 


Urine Glucose (UA)


 


 100 mg/dL


(NEG)


 


Urine Ketones (Stick)


 


 Negative mg/dL


(NEG)


 


Urine Blood  Small (NEG)  


 


Urine Nitrite


 


 Negative (NEG)





 


Urine Bilirubin


 


 Negative (NEG)





 


Urine Urobilinogen Dipstick


 


 0.2 mg/dL (0.2


mg/dL)


 


Urine Leukocyte Esterase


 


 Negative (NEG)





 


Urine RBC


 


 Occ /HPF (0-2)





 


Urine WBC


 


 1-4 /HPF (0-4)





 


Urine Squamous Epithelial


Cells 


 Few /LPF  





 


Urine Bacteria


 


 Few /HPF


(0-FEW)


 


Urine Pregnancy Test


 


 Negative (NEG)








                                Laboratory Tests


10/2/19 19:48








                                Laboratory Tests


10/2/19 19:48











EKG


EKG


[]





Radiology/Procedures


Radiology/Procedures


[facility]


                    [address] [place] [address]


                                 [phone]


                                        


                                 IMAGING REPORT





                                     Signed





PATIENT: TINA ERICKSON DACCOUNT: [id number]     MRN#: [id number]


: 1980           LOCATION: ER              AGE: 39


SEX: F                    EXAM DT: 10/02/19         ACCESSION#: [id number]


STATUS: REG ER            ORD. PHYSICIAN: JOSI LARSEN MD


REASON: abd pain RLQ,  HD patient (ok for contrast)


PROCEDURE: CT ABD PELV W/ IV CONTRST ONLY





Exam: CT abdomen and pelvis with contrast


 


INDICATION: Abdominal pain, right lower quadrant


 


TECHNIQUE: Sequential axial images through the abdomen and pelvis obtained


following the administration of 75 mL of Omni 300 IV contrast. Sagittal 


and coronal reformatted images were reconstructed from the axial data and 


reviewed.


 


Comparisons: CT 2019


 


FINDINGS:


Heart is mildly enlarged. No pericardial effusion. Visualized lung bases 


are clear. No pleural effusion.


 


Liver, spleen, pancreas, gallbladder and adrenals are unremarkable.


 


Kidneys demonstrate symmetric enhancement. No perinephric inflammation or 


hydronephrosis. No renal or ureteral calculi are identified.


 


Bladder is decompressed not well evaluated. Uterus is not enlarged. No 


abnormal adnexal mass.


 


Several diverticula noted within the colon without evidence of acute 


diverticulitis. Otherwise, Large and small bowel are unremarkable. 


Appendix is normal. No free intra-abdominal air or fluid. No obstruction.


 


Abdominal aorta has a normal course and caliber. Abdominal vasculature is 


patent. Partially visualized left femoral bypass graft is noted.


 


No enlarged intra-abdominal lymph nodes are identified.


 


No suspicious osseous lesions or acute fractures.


 


IMPRESSION:


1.  Normal appendix. No acute process identified within the abdomen or 


pelvis.


2.  Diverticulosis without evidence of acute diverticulitis.


 


 


Exposure: One or more of the following in the visualized dose reduction 


techniques were utilized for this examination:


1.  Automated exposure control


2.  Adjustment of the MA and/or KV according to patient size


3.  Use of iterative of reconstructive technique


 


Electronically signed by: Earline Alegria MD (10/2/2019 9:20 PM) [facility]














DICTATED and SIGNED BY:     EARLINE ALEGRIA MD


DATE:     10/02/19 2120


[]





Course & Med Decision Making


Course & Med Decision Making


Pertinent Labs and Imaging studies reviewed. (See chart for details)





[]39-year-old female presents to the emergency department with complaints of 

right lower quadrant abdominal pain. Patient states that 2 weeks ago she had 

similar complaints she was seen at  with CT scan and ultrasound performed 

which was unremarkable. Denies any fevers however has had some intermittent 

chills. She as well describes 2 days of vomiting.  She describes the pain as 

stabbing. She states movement, coughing makes her pain worse. She denies any 

vaginal discharge. Patient has underlying history of HTN





Patient's labs were reviewed, white blood cell count 7.9 urinalysis without 

evidence of urinary tract infection, CT abdomen and pelvis negative for acute 

process, appendix is within normal limits. Patient's pain is improved she is 

resting comfortably at this time. Blood pressure did improve to 195/95. At this 

time will plan for discharge, Bentyl, Zofran will be provided upon discharge. 

Recommend following with hemodialysis as scheduled tomorrow. Discharge 

instructions discussed with patient at bedside, she voices understanding.





Dragon Disclaimer


Dragon Disclaimer


This electronic medical record was generated, in whole or in part, using a voice

 recognition dictation system.





Departure


Departure


Impression:  


   Primary Impression:  


   Abdominal pain


   Additional Impression:  


   Nausea & vomiting


Disposition:  01 HOME, SELF-CARE


Condition:  STABLE


Referrals:  


UNKNOWN PCP NAME (PCP)


Patient Instructions:  Abdominal Pain (Nonspecific), Nausea and Vomiting, 

Easy-to-Read





Additional Instructions:  


Recommend follow up with PCP 3 - 5 days


Return to the ER with worsening symptoms, intractable pain, fever, altered 

mental status


Tylenol/Motrin as needed for pain


Zofran rx provided for nausea


bentyl rx provided for pain


Scripts


Dicyclomine Hcl (DICYCLOMINE HCL) 20 Mg Tablet


1 TAB PO TID, #30 TAB 1 Refill


   Prov: JOSI LARSEN MD         10/2/19 


Ondansetron Hcl (ZOFRAN) 4 Mg Tablet


1 TAB PO PRN Q6-8HRS, #12 TAB


   Prov: JOSI LARSEN MD         10/2/19





Problem Qualifiers








   Primary Impression:  


   Abdominal pain


   Abdominal location:  right lower quadrant  Qualified Codes:  R10.31 - Right 

   lower quadrant pain


   Additional Impression:  


   Nausea & vomiting


   Vomiting type:  unspecified  Vomiting Intractability:  non-intractable  Qu

   alified Codes:  R11.2 - Nausea with vomiting, unspecified








JOSI LARSEN MD               Oct 2, 2019 18:51

## 2019-10-02 NOTE — RAD
Exam: CT abdomen and pelvis with contrast

 

INDICATION: Abdominal pain, right lower quadrant

 

TECHNIQUE: Sequential axial images through the abdomen and pelvis obtained

following the administration of 75 mL of Omni 300 IV contrast. Sagittal 

and coronal reformatted images were reconstructed from the axial data and 

reviewed.

 

Comparisons: CT 7/25/2019

 

FINDINGS:

Heart is mildly enlarged. No pericardial effusion. Visualized lung bases 

are clear. No pleural effusion.

 

Liver, spleen, pancreas, gallbladder and adrenals are unremarkable.

 

Kidneys demonstrate symmetric enhancement. No perinephric inflammation or 

hydronephrosis. No renal or ureteral calculi are identified.

 

Bladder is decompressed not well evaluated. Uterus is not enlarged. No 

abnormal adnexal mass.

 

Several diverticula noted within the colon without evidence of acute 

diverticulitis. Otherwise, Large and small bowel are unremarkable. 

Appendix is normal. No free intra-abdominal air or fluid. No obstruction.

 

Abdominal aorta has a normal course and caliber. Abdominal vasculature is 

patent. Partially visualized left femoral bypass graft is noted.

 

No enlarged intra-abdominal lymph nodes are identified.

 

No suspicious osseous lesions or acute fractures.

 

IMPRESSION:

1.  Normal appendix. No acute process identified within the abdomen or 

pelvis.

2.  Diverticulosis without evidence of acute diverticulitis.

 

 

Exposure: One or more of the following in the visualized dose reduction 

techniques were utilized for this examination:

1.  Automated exposure control

2.  Adjustment of the MA and/or KV according to patient size

3.  Use of iterative of reconstructive technique

 

Electronically signed by: Earline Celis MD (10/2/2019 9:20 PM) [facility]

## 2021-03-30 ENCOUNTER — HOSPITAL ENCOUNTER (EMERGENCY)
Dept: HOSPITAL 61 - ER | Age: 41
Discharge: HOME | End: 2021-03-30
Payer: MEDICARE

## 2021-03-30 VITALS — WEIGHT: 216.05 LBS | HEIGHT: 66 IN | BODY MASS INDEX: 34.72 KG/M2

## 2021-03-30 VITALS — SYSTOLIC BLOOD PRESSURE: 118 MMHG | DIASTOLIC BLOOD PRESSURE: 69 MMHG
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
## 2021-03-30 DIAGNOSIS — I10: ICD-10-CM
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
## 2021-03-30 DIAGNOSIS — Z91.040: ICD-10-CM

## 2021-03-30 DIAGNOSIS — Z98.890: ICD-10-CM

## 2021-03-30 DIAGNOSIS — S00.452A: Primary | ICD-10-CM

## 2021-03-30 DIAGNOSIS — H92.02: ICD-10-CM

## 2021-03-30 DIAGNOSIS — E11.22: ICD-10-CM

## 2021-03-30 PROCEDURE — 99284 EMERGENCY DEPT VISIT MOD MDM: CPT

## 2021-03-30 NOTE — ED.ADGEN
Past Medical History


Past Medical History:  Diabetes-Type II, Hypertension, Renal Failure


Past Surgical History:  


Additional Past Surgical Histo:  PERITONEAL SURGERY


Smoking Status:  Never Smoker


Alcohol Use:  None


Drug Use:  None





General Adult


EDM:


Chief Complaint:  FOREIGNBODY EAR





HPI:


HPI:





Patient is a 40  year old AA female who presents emergency department with 

complaints of a bug being stuck in her left ear since early this morning.  

Patient reports discomfort in her left ear, she denies any discharge, bleeding, 

or decreased hearing.  Patient reports that she can feel something moving around

inside of her left ear.  She denies any known injury.  The patient currently 

rates the discomfort a 10 out of 10 on the pain scale, she denies any 

alleviating factors.





Review of Systems:


Review of Systems:


Complete ROS is negative unless otherwise noted in HPI.





Current Medications:





Current Medications








 Medications


  (Trade)  Dose


 Ordered  Sig/Mikayla  Start Time


 Stop Time Status Last Admin


Dose Admin


 


 Lidocaine HCl


  (Lidocaine 1%


 20ml Vial)  20 ml  STK-MED ONCE  3/30/21 17:51


 3/30/21 17:51 DC  














Allergies:


Allergies:





Allergies








Coded Allergies Type Severity Reaction Last Updated Verified


 


  latex Allergy Intermediate RASH 19 Yes











Physical Exam:


PE:


See Above


Constitutional: Well developed, well nourished, no acute distress, non-toxic 

appearance. []


HENT: Normocephalic, atraumatic, bilateral external ears normal, nose normal; 

visible brown bug inside the left ear canal, no bleeding, no drainage.. []


Eyes: PERRLA, EOMI, conjunctiva normal, no discharge. [] 


Neck: Normal range of motion, no stridor. [] 


Cardiovascular:Heart rate regular rhythm


Lungs & Thorax:  Respirations even and unlabored, no retractions, no respiratory

 distress


Skin: Warm, dry, no erythema, no rash. [] 


Extremities: No cyanosis, ROM intact, no edema. [] 


Neurologic: Alert and oriented X 3, no focal deficits noted. []


Psychologic: Affect normal, judgement normal, mood normal. []





Current Patient Data:


Vital Signs:





                                   Vital Signs








  Date Time  Temp Pulse Resp B/P (MAP) Pulse Ox O2 Delivery O2 Flow Rate FiO2


 


3/30/21 17:41 97.3 70 16 118/69 (85) 98 Room Air  





 97.3       











EKG:


EKG:


[]





Heart Score:


C/O Chest Pain:  No


Risk Factors:


Risk Factors:  DM, Current or recent (<one month) smoker, HTN, HLP, family 

history of CAD, obesity.


Risk Scores:


Score 0 - 3:  2.5% MACE over next 6 weeks - Discharge Home


Score 4 - 6:  20.3% MACE over next 6 weeks - Admit for Clinical Observation


Score 7 - 10:  72.7% MACE over next 6 weeks - Early Invasive Strategies





Radiology/Procedures:


Radiology/Procedures:


Attempts to remove the bug from the patient's left ear were only partially 

successful.  This practitioner tried to flush the bug out using a mixture of 

warm water and peroxide without success, I also tried to remove the bug using 

suction unsuccessfully.  Dr. Bettencourt was able to remove most of the body of the 

insect with Warren forceps however complete removal of the bug was 

unsuccessful..  []





Course & Med Decision Making:


Course & Med Decision Making


Pertinent Labs and Imaging studies reviewed. (See chart for details)


-I spoke with Dr. Durán a physician on-call for ear nose and throat.  I

 advised the patient that I am referring the patient to the office tomorrow so 

that the remainder of the bottle can be removed.  No further recommendations 

from Dr. Durán.  I will instruct patient to contact the office first thing 

tomorrow morning.





Patient was encouraged to take Tylenol or ibuprofen as needed for pain.  Follow-

up with ENT tomorrow as planned.





Patient verbalized an understanding of home care, medications, follow-up, and 

return to ED instructions and was in agreement with the plan of care.


[]


///////////////////////////////////////////////////////////////////////////


I oversaw on the above date of service of this patient and discussed the care 

with the NP.  I attempted to extract foreign body from left ear and managed to 

extract half of carcass leaving behind hard shell, patient in pain.  TM not 

perforated.  Joint decision to discontinue further attempts for continued suppo

rtive care and repeat evaluation and intervention in outpatient setting.  I 

agree with the findings, plan of care, and disposition as documented.





Electronically signed, DO Natalie Dasilva Disclaimer:


Dragon Disclaimer:


This electronic medical record was generated, in whole or in part, using a voice

 recognition dictation system.





Departure


Departure


Impression:  


   Primary Impression:  


   Acute foreign body of left ear


Disposition:  01 DC HOME SELF CARE/HOMELESS


Condition:  STABLE


Referrals:  


UNKNOWN PCP NAME (PCP)








LOUIS SOTO MD


Patient Instructions:  Ear Foreign Body, Easy-to-Read





Additional Instructions:  


Call Dr. Soto's office first thing in the morning to arrange for follow-up 

tomorrow.  You may take Tylenol or ibuprofen as needed for pain.  Return to the 

ER if your symptoms worsen or fever develops.





Problem Qualifiers








   Primary Impression:  


   Acute foreign body of left ear


   Encounter type:  initial encounter  Qualified Codes:  T16.2XXA - Foreign body

    in left ear, initial encounter








CORDELIA VOSS       Mar 30, 2021 19:51


MONTY GUTHRIE DO                  Apr 3, 2021 17:29